# Patient Record
Sex: FEMALE | Race: WHITE | NOT HISPANIC OR LATINO | Employment: FULL TIME | URBAN - METROPOLITAN AREA
[De-identification: names, ages, dates, MRNs, and addresses within clinical notes are randomized per-mention and may not be internally consistent; named-entity substitution may affect disease eponyms.]

---

## 2018-04-16 ENCOUNTER — OFFICE VISIT CONVERTED (OUTPATIENT)
Dept: FAMILY MEDICINE CLINIC | Facility: CLINIC | Age: 28
End: 2018-04-16
Attending: NURSE PRACTITIONER

## 2018-05-03 ENCOUNTER — CONVERSION ENCOUNTER (OUTPATIENT)
Dept: FAMILY MEDICINE CLINIC | Facility: CLINIC | Age: 28
End: 2018-05-03

## 2018-05-03 ENCOUNTER — OFFICE VISIT CONVERTED (OUTPATIENT)
Dept: FAMILY MEDICINE CLINIC | Facility: CLINIC | Age: 28
End: 2018-05-03
Attending: NURSE PRACTITIONER

## 2018-06-04 ENCOUNTER — CONVERSION ENCOUNTER (OUTPATIENT)
Dept: FAMILY MEDICINE CLINIC | Facility: CLINIC | Age: 28
End: 2018-06-04

## 2018-06-04 ENCOUNTER — OFFICE VISIT CONVERTED (OUTPATIENT)
Dept: FAMILY MEDICINE CLINIC | Facility: CLINIC | Age: 28
End: 2018-06-04
Attending: NURSE PRACTITIONER

## 2018-08-16 ENCOUNTER — OFFICE VISIT CONVERTED (OUTPATIENT)
Dept: FAMILY MEDICINE CLINIC | Facility: CLINIC | Age: 28
End: 2018-08-16
Attending: NURSE PRACTITIONER

## 2018-08-16 ENCOUNTER — CONVERSION ENCOUNTER (OUTPATIENT)
Dept: FAMILY MEDICINE CLINIC | Facility: CLINIC | Age: 28
End: 2018-08-16

## 2018-11-01 ENCOUNTER — OFFICE VISIT CONVERTED (OUTPATIENT)
Dept: FAMILY MEDICINE CLINIC | Facility: CLINIC | Age: 28
End: 2018-11-01
Attending: NURSE PRACTITIONER

## 2019-08-22 ENCOUNTER — HOSPITAL ENCOUNTER (OUTPATIENT)
Dept: FAMILY MEDICINE CLINIC | Facility: CLINIC | Age: 29
Discharge: HOME | End: 2019-08-22
Attending: NURSE PRACTITIONER

## 2019-08-22 ENCOUNTER — OFFICE VISIT CONVERTED (OUTPATIENT)
Dept: FAMILY MEDICINE CLINIC | Facility: CLINIC | Age: 29
End: 2019-08-22
Attending: NURSE PRACTITIONER

## 2019-08-22 LAB
ALBUMIN SERPL-MCNC: 3.8 G/DL (ref 3.5–5)
ALBUMIN/GLOB SERPL: 1.3 {RATIO} (ref 1.4–2.6)
ALP SERPL-CCNC: 90 U/L (ref 42–98)
ALT SERPL-CCNC: 22 U/L (ref 10–40)
ANION GAP SERPL CALC-SCNC: 14 MMOL/L (ref 8–19)
AST SERPL-CCNC: 21 U/L (ref 15–50)
BASOPHILS # BLD AUTO: 0.04 10*3/UL (ref 0–0.2)
BASOPHILS NFR BLD AUTO: 0.5 % (ref 0–3)
BILIRUB SERPL-MCNC: 0.36 MG/DL (ref 0.2–1.3)
BUN SERPL-MCNC: 7 MG/DL (ref 5–25)
BUN/CREAT SERPL: 9 {RATIO} (ref 6–20)
CALCIUM SERPL-MCNC: 9.2 MG/DL (ref 8.7–10.4)
CHLORIDE SERPL-SCNC: 104 MMOL/L (ref 99–111)
CHOLEST SERPL-MCNC: 137 MG/DL (ref 107–200)
CHOLEST/HDLC SERPL: 2.9 {RATIO} (ref 3–6)
CONV ABS IMM GRAN: 0.02 10*3/UL (ref 0–0.2)
CONV CO2: 24 MMOL/L (ref 22–32)
CONV IMMATURE GRAN: 0.3 % (ref 0–1.8)
CONV TOTAL PROTEIN: 6.8 G/DL (ref 6.3–8.2)
CREAT UR-MCNC: 0.81 MG/DL (ref 0.5–0.9)
DEPRECATED RDW RBC AUTO: 43.3 FL (ref 36.4–46.3)
EOSINOPHIL # BLD AUTO: 0.21 10*3/UL (ref 0–0.7)
EOSINOPHIL # BLD AUTO: 2.6 % (ref 0–7)
ERYTHROCYTE [DISTWIDTH] IN BLOOD BY AUTOMATED COUNT: 13.2 % (ref 11.7–14.4)
GFR SERPLBLD BASED ON 1.73 SQ M-ARVRAT: >60 ML/MIN/{1.73_M2}
GLOBULIN UR ELPH-MCNC: 3 G/DL (ref 2–3.5)
GLUCOSE SERPL-MCNC: 93 MG/DL (ref 65–99)
HCT VFR BLD AUTO: 41.2 % (ref 37–47)
HDLC SERPL-MCNC: 47 MG/DL (ref 40–60)
HGB BLD-MCNC: 13 G/DL (ref 12–16)
LDLC SERPL CALC-MCNC: 66 MG/DL (ref 70–100)
LYMPHOCYTES # BLD AUTO: 2.17 10*3/UL (ref 1–5)
LYMPHOCYTES NFR BLD AUTO: 27.1 % (ref 20–45)
MCH RBC QN AUTO: 27.9 PG (ref 27–31)
MCHC RBC AUTO-ENTMCNC: 31.6 G/DL (ref 33–37)
MCV RBC AUTO: 88.4 FL (ref 81–99)
MONOCYTES # BLD AUTO: 0.55 10*3/UL (ref 0.2–1.2)
MONOCYTES NFR BLD AUTO: 6.9 % (ref 3–10)
NEUTROPHILS # BLD AUTO: 5.01 10*3/UL (ref 2–8)
NEUTROPHILS NFR BLD AUTO: 62.6 % (ref 30–85)
NRBC CBCN: 0 % (ref 0–0.7)
OSMOLALITY SERPL CALC.SUM OF ELEC: 284 MOSM/KG (ref 273–304)
PLATELET # BLD AUTO: 320 10*3/UL (ref 130–400)
PMV BLD AUTO: 11.5 FL (ref 9.4–12.3)
POTASSIUM SERPL-SCNC: 4.4 MMOL/L (ref 3.5–5.3)
RBC # BLD AUTO: 4.66 10*6/UL (ref 4.2–5.4)
SODIUM SERPL-SCNC: 138 MMOL/L (ref 135–147)
TRIGL SERPL-MCNC: 121 MG/DL (ref 40–150)
VLDLC SERPL-MCNC: 24 MG/DL (ref 5–37)
WBC # BLD AUTO: 8 10*3/UL (ref 4.8–10.8)

## 2019-09-11 ENCOUNTER — HOSPITAL ENCOUNTER (OUTPATIENT)
Dept: URGENT CARE | Facility: CLINIC | Age: 29
Discharge: HOME | End: 2019-09-11

## 2019-09-13 ENCOUNTER — HOSPITAL ENCOUNTER (OUTPATIENT)
Dept: URGENT CARE | Facility: CLINIC | Age: 29
Discharge: HOME | End: 2019-09-13
Attending: EMERGENCY MEDICINE

## 2020-03-18 ENCOUNTER — HOSPITAL ENCOUNTER (OUTPATIENT)
Dept: FAMILY MEDICINE CLINIC | Facility: CLINIC | Age: 30
Discharge: HOME | End: 2020-03-18
Attending: NURSE PRACTITIONER

## 2020-03-18 ENCOUNTER — OFFICE VISIT CONVERTED (OUTPATIENT)
Dept: FAMILY MEDICINE CLINIC | Facility: CLINIC | Age: 30
End: 2020-03-18
Attending: NURSE PRACTITIONER

## 2020-03-20 LAB — BACTERIA SPEC AEROBE CULT: NORMAL

## 2020-06-22 ENCOUNTER — HOSPITAL ENCOUNTER (OUTPATIENT)
Dept: FAMILY MEDICINE CLINIC | Facility: CLINIC | Age: 30
Discharge: HOME | End: 2020-06-22
Attending: NURSE PRACTITIONER

## 2020-06-22 ENCOUNTER — OFFICE VISIT CONVERTED (OUTPATIENT)
Dept: FAMILY MEDICINE CLINIC | Facility: CLINIC | Age: 30
End: 2020-06-22
Attending: NURSE PRACTITIONER

## 2020-06-22 LAB
25(OH)D3 SERPL-MCNC: 27.1 NG/ML (ref 30–100)
ALBUMIN SERPL-MCNC: 3.8 G/DL (ref 3.5–5)
ALBUMIN/GLOB SERPL: 1.3 {RATIO} (ref 1.4–2.6)
ALP SERPL-CCNC: 103 U/L (ref 42–98)
ALT SERPL-CCNC: 22 U/L (ref 10–40)
ANION GAP SERPL CALC-SCNC: 14 MMOL/L (ref 8–19)
AST SERPL-CCNC: 19 U/L (ref 15–50)
BASOPHILS # BLD AUTO: 0.08 10*3/UL (ref 0–0.2)
BASOPHILS NFR BLD AUTO: 0.8 % (ref 0–3)
BILIRUB SERPL-MCNC: 0.19 MG/DL (ref 0.2–1.3)
BUN SERPL-MCNC: 9 MG/DL (ref 5–25)
BUN/CREAT SERPL: 12 {RATIO} (ref 6–20)
CALCIUM SERPL-MCNC: 9.1 MG/DL (ref 8.7–10.4)
CHLORIDE SERPL-SCNC: 103 MMOL/L (ref 99–111)
CHOLEST SERPL-MCNC: 139 MG/DL (ref 107–200)
CHOLEST/HDLC SERPL: 2.7 {RATIO} (ref 3–6)
CONV ABS IMM GRAN: 0.03 10*3/UL (ref 0–0.2)
CONV CO2: 24 MMOL/L (ref 22–32)
CONV IMMATURE GRAN: 0.3 % (ref 0–1.8)
CONV TOTAL PROTEIN: 6.8 G/DL (ref 6.3–8.2)
CREAT UR-MCNC: 0.78 MG/DL (ref 0.5–0.9)
DEPRECATED RDW RBC AUTO: 44.1 FL (ref 36.4–46.3)
EOSINOPHIL # BLD AUTO: 0.29 10*3/UL (ref 0–0.7)
EOSINOPHIL # BLD AUTO: 2.8 % (ref 0–7)
ERYTHROCYTE [DISTWIDTH] IN BLOOD BY AUTOMATED COUNT: 13.9 % (ref 11.7–14.4)
GFR SERPLBLD BASED ON 1.73 SQ M-ARVRAT: >60 ML/MIN/{1.73_M2}
GLOBULIN UR ELPH-MCNC: 3 G/DL (ref 2–3.5)
GLUCOSE SERPL-MCNC: 94 MG/DL (ref 65–99)
HCT VFR BLD AUTO: 40.5 % (ref 37–47)
HDLC SERPL-MCNC: 51 MG/DL (ref 40–60)
HGB BLD-MCNC: 12.5 G/DL (ref 12–16)
LDLC SERPL CALC-MCNC: 67 MG/DL (ref 70–100)
LYMPHOCYTES # BLD AUTO: 2.81 10*3/UL (ref 1–5)
LYMPHOCYTES NFR BLD AUTO: 27.2 % (ref 20–45)
MCH RBC QN AUTO: 27.2 PG (ref 27–31)
MCHC RBC AUTO-ENTMCNC: 30.9 G/DL (ref 33–37)
MCV RBC AUTO: 88 FL (ref 81–99)
MONOCYTES # BLD AUTO: 0.64 10*3/UL (ref 0.2–1.2)
MONOCYTES NFR BLD AUTO: 6.2 % (ref 3–10)
NEUTROPHILS # BLD AUTO: 6.48 10*3/UL (ref 2–8)
NEUTROPHILS NFR BLD AUTO: 62.7 % (ref 30–85)
NRBC CBCN: 0 % (ref 0–0.7)
OSMOLALITY SERPL CALC.SUM OF ELEC: 282 MOSM/KG (ref 273–304)
PLATELET # BLD AUTO: 344 10*3/UL (ref 130–400)
PMV BLD AUTO: 11.7 FL (ref 9.4–12.3)
POTASSIUM SERPL-SCNC: 4.2 MMOL/L (ref 3.5–5.3)
RBC # BLD AUTO: 4.6 10*6/UL (ref 4.2–5.4)
SODIUM SERPL-SCNC: 137 MMOL/L (ref 135–147)
TRIGL SERPL-MCNC: 105 MG/DL (ref 40–150)
TSH SERPL-ACNC: 1.26 M[IU]/L (ref 0.27–4.2)
VLDLC SERPL-MCNC: 21 MG/DL (ref 5–37)
WBC # BLD AUTO: 10.33 10*3/UL (ref 4.8–10.8)

## 2020-09-28 ENCOUNTER — HOSPITAL ENCOUNTER (OUTPATIENT)
Dept: URGENT CARE | Facility: CLINIC | Age: 30
Discharge: HOME | End: 2020-09-28
Attending: NURSE PRACTITIONER

## 2020-09-30 LAB
BACTERIA SPEC AEROBE CULT: NORMAL
SARS-COV-2 RNA SPEC QL NAA+PROBE: NOT DETECTED

## 2020-10-07 ENCOUNTER — OFFICE VISIT CONVERTED (OUTPATIENT)
Dept: FAMILY MEDICINE CLINIC | Facility: CLINIC | Age: 30
End: 2020-10-07
Attending: NURSE PRACTITIONER

## 2021-01-27 ENCOUNTER — OFFICE VISIT CONVERTED (OUTPATIENT)
Dept: FAMILY MEDICINE CLINIC | Facility: CLINIC | Age: 31
End: 2021-01-27
Attending: NURSE PRACTITIONER

## 2021-01-27 ENCOUNTER — HOSPITAL ENCOUNTER (OUTPATIENT)
Dept: FAMILY MEDICINE CLINIC | Facility: CLINIC | Age: 31
Discharge: HOME | End: 2021-01-27
Attending: NURSE PRACTITIONER

## 2021-01-27 LAB
ALBUMIN SERPL-MCNC: 4 G/DL (ref 3.5–5)
ALBUMIN/GLOB SERPL: 1.3 {RATIO} (ref 1.4–2.6)
ALP SERPL-CCNC: 112 U/L (ref 42–98)
ALT SERPL-CCNC: 49 U/L (ref 10–40)
ANION GAP SERPL CALC-SCNC: 15 MMOL/L (ref 8–19)
AST SERPL-CCNC: 54 U/L (ref 15–50)
BILIRUB SERPL-MCNC: 0.45 MG/DL (ref 0.2–1.3)
BUN SERPL-MCNC: 5 MG/DL (ref 5–25)
BUN/CREAT SERPL: 6 {RATIO} (ref 6–20)
CALCIUM SERPL-MCNC: 8.9 MG/DL (ref 8.7–10.4)
CHLORIDE SERPL-SCNC: 103 MMOL/L (ref 99–111)
CONV CO2: 22 MMOL/L (ref 22–32)
CONV TOTAL PROTEIN: 7.1 G/DL (ref 6.3–8.2)
CREAT UR-MCNC: 0.79 MG/DL (ref 0.5–0.9)
GFR SERPLBLD BASED ON 1.73 SQ M-ARVRAT: >60 ML/MIN/{1.73_M2}
GLOBULIN UR ELPH-MCNC: 3.1 G/DL (ref 2–3.5)
GLUCOSE SERPL-MCNC: 99 MG/DL (ref 65–99)
OSMOLALITY SERPL CALC.SUM OF ELEC: 279 MOSM/KG (ref 273–304)
POTASSIUM SERPL-SCNC: 3.9 MMOL/L (ref 3.5–5.3)
SODIUM SERPL-SCNC: 136 MMOL/L (ref 135–147)
T4 FREE SERPL-MCNC: 0.9 NG/DL (ref 0.9–1.8)
TSH SERPL-ACNC: 0.82 M[IU]/L (ref 0.27–4.2)

## 2021-01-28 LAB — 25(OH)D3 SERPL-MCNC: 22.1 NG/ML (ref 30–100)

## 2021-02-26 ENCOUNTER — HOSPITAL ENCOUNTER (OUTPATIENT)
Dept: ULTRASOUND IMAGING | Facility: HOSPITAL | Age: 31
Discharge: HOME | End: 2021-02-26
Attending: NURSE PRACTITIONER

## 2021-04-28 ENCOUNTER — HOSPITAL ENCOUNTER (OUTPATIENT)
Dept: FAMILY MEDICINE CLINIC | Facility: CLINIC | Age: 31
Discharge: HOME | End: 2021-04-28
Attending: NURSE PRACTITIONER

## 2021-04-29 LAB — 25(OH)D3 SERPL-MCNC: 30.3 NG/ML (ref 30–100)

## 2021-05-13 NOTE — PROGRESS NOTES
Progress Note      Patient Name: Mendoza Jaramillo   Patient ID: [id number]   Sex: Female   YOB: 1990    Primary Care Provider: Latisha TIRADO    Visit Date: October 7, 2020    Provider: CANDIDA Holbrook   Location: [facility]   Location Address: [address] [place] [address]  [id number]   Location Phone: [phone]          Chief Complaint  · Check up to make sure she is safe to become pregnant      History Of Present Illness  Mendoza Jaramillo is a 29 year old /White female who presents for evaluation and treatment of:      Check up to make sure she is safe to become pregnant.  Last routine lab work done in 6/2020  Pt wants to make sure the meds she is taking is safe for her during pregnancy.    anxiety/depression - doing well with med.     HTN stable will change med when pt gets pregnant.    Last Pap- 11/2019 Dr. Cardona  Flu shot- requested today    BMI 32 - pt doesn't exercise often. Pt reports food is her stress relief. PT only drinks 2-3 bottles of water daily.       Past Medical History  Disease Name Date Onset Notes   Abdominal pain --  --    Accelerated hypertension --  --    Acute depression --  --    Anxiety --  --    Esophageal ulcer without bleeding --  --    Essential hypertension --  --    Gastric Ulcer --  --    GERD --  --    Hiatal hernia 04/16/2018 --    Interstitial cystitis 06/02/2017 --    Kidney stones --  --    Microhematuria --  --    Overactive bladder 06/02/2017 --    Stomach ulcer --  --          Past Surgical History  Procedure Name Date Notes   Colonoscopy 8/2017 --    EGD (Endoscopy) 5/2017 --    [place] Tooth Extraction --  --          Medication List  Name Date Started Instructions   apple cider vinegar oral  Take 1 Gummie PO QD   Lexapro 10 mg oral tablet 06/22/2020 take 1 tablet (10 mg) by oral route once daily for 90 days for 14 days   lisinopril 5 mg oral tablet 06/22/2020 take 1 tablet (5 mg) by oral  route once daily for 90 days for 90 days   omeprazole 20 mg oral capsule,delayed release(DR/EC) 06/22/2020 take 1 capsule (20 mg) by oral route once daily before a meal   Trivora (28) 50-30 (6)/75-40 (5)/125-30(10) oral tablet  take 1 tablet by oral route once daily for 28 days   Vitamin C 500 mg oral tablet  take 1 tablet by oral route daily   Vitamin D3 50 mcg (2,000 unit) oral tablet 06/22/2020 take 1 tablet by oral route daily         Allergy List  Allergen Name Date Reaction Notes   amoxicillin --  hives, yeast infection --    SULFA (SULFONAMIDES) --  face, eyes swelling --          Family Medical History  Disease Name Relative/Age Notes   Family history of colon cancer Uncle/   --    Family history of diabetes mellitus Grandmother (maternal)/   --    Family history of hypertension Brother/  Father/  Grandmother (paternal)/   --    Family history of kidney stones Mother/   --          Social History  Finding Status Start/Stop Quantity Notes   Alcohol Current some day --/-- --  --    Tobacco Never --/-- --  --          Immunizations  NameDate Admin Mfg Trade Name Lot Number Route Inj VIS Given VIS Publication   Aavjunjro19/07/2020 PMC Fluzone Quadrivalent [id number] IM LD 10/07/2020 08/15/2019   Comments: Injection given. Pt tolerated well. NDC 53827-858-81         Review of Systems  · Constitutional  o Admits  o : weight gain  o Denies  o : fever, fatigue, weight loss  · Cardiovascular  o Denies  o : lower extremity edema, claudication, chest pressure, palpitations  · Respiratory  o Denies  o : shortness of breath, wheezing, frequent cough, hemoptysis, dyspnea on exertion  · Gastrointestinal  o Denies  o : nausea, vomiting, diarrhea, constipation, abdominal pain  · Psychiatric  o Admits  o : anxiety, depression  o Denies  o : difficulty sleeping, mood changes, memory changes, SI/HI      Vitals  Date Time BP Position Site L\R Cuff Size HR RR TEMP (F) WT  HT  BMI kg/m2 BSA m2 O2 Sat FR L/min FiO2 HC      "  03/18/2020 11:57 /89 Sitting    114 - R 18 99 169lbs 0oz 5'  2\" 30.91 1.83 97 %      06/22/2020 07:33 /68 Sitting    72 - R 18 98.2 171lbs 2oz 5'  2\" 31.3 1.84 98 %      10/07/2020 07:54 /80 Sitting    85 - R 12 97.7 175lbs 16oz 5'  2\" 32.19 1.87 99 %            Physical Examination  · Constitutional  o Appearance  o : well-nourished, in no acute distress  · Eyes  o Conjunctivae  o : conjunctivae normal  o Sclerae  o : sclerae white  o Pupils and Irises  o : pupils equal and round  o Eyelids/Ocular Adnexae  o : eyelid appearance normal, no exudates present  · Neck  o Inspection/Palpation  o : normal appearance, no masses or tenderness, trachea midline  o Range of Motion  o : cervical range of motion within normal limits  o Thyroid  o : gland size normal, nontender, no nodules or masses present on palpation  · Respiratory  o Respiratory Effort  o : breathing unlabored  o Inspection of Chest  o : normal appearance  o Auscultation of Lungs  o : normal breath sounds throughout inspiration and expiration  · Cardiovascular  o Heart  o :   § Auscultation of Heart  § : regular rate and rhythm, no murmurs, gallops or rubs  o Peripheral Vascular System  o :   § Carotid Arteries  § : normal pulses bilaterally, no bruits present  § Extremities  § : no clubbing or edema  · Gastrointestinal  o Abdominal Examination  o : abdomen nontender to palpation, tone normal without rigidity or guarding, no masses present, bowel sounds present  · Skin and Subcutaneous Tissue  o General Inspection  o : no rashes or lesions present, no areas of discoloration  o Body Hair  o : hair normal for age, general body hair distribution normal for age  o Digits and Nails  o : no clubbing, cyanosis, deformities or edema present, normal appearing nails  · Neurologic  o Mental Status Examination  o :   § Orientation  § : grossly oriented to person, place and time  o Gait and Station  o : normal gait, able to stand without " difficulty  · Psychiatric  o Judgement and Insight  o : judgment and insight intact  o Mood and Affect  o : mood normal, affect appropriate          Assessment  · Screening for depression     V79.0/Z13.89  · Need for influenza vaccination     V04.81/Z23  · Anxiety disorder     300.00/F41.9  · Depression     311/F32.9  · Essential hypertension     401.9/I10  Call when pregnant and we will change medication.   · GERD (gastroesophageal reflux disease)     530.81/K21.9  · Vitamin D deficiency     268.9/E55.9      Plan  · Orders  o Immunization Admin Fee (Single) ([facility]) (29977) - V04.81/Z23 - 10/07/2020  o Fluzone Quadrivalent Vaccine, age 6 months + (60870) - V04.81/Z23 - 10/07/2020   Vaccine - Influenza; Dose: 0.5; Site: Left Deltoid; Route: Intramuscular; Date: 10/07/2020 08:36:00; Exp: 06/01/3021; Lot: [id number]; Mfg: sanofi pasteur; TradeName: Fluzone Quadrivalent; Administered By: Merle Al; Comment: Injection given. Pt tolerated well. NDC 30221-451-21  o ACO-39: Current medications updated and reviewed (, 1159F) - - 10/07/2020  o ACO-18: Positive screen for clinical depression using a standardized tool and a follow-up plan documented () - - 10/07/2020  o ACO-14: Influenza immunization administered or previously received [facility] () - - 10/07/2020  · Medications  o Medications have been Reconciled  o Transition of Care or Provider Policy  · Instructions  o Depression Screen completed and scanned into the EMR under the designated folder within the patient's documents.  o Today's PHQ-9 result is __8_  o Patient was given an SSRI/SSNRI medication and warned of possible side effects of the medication including potential for increased risk of suicidal thoughts and feelings. Patient was instructed that if they begin to exhibit any of these effects they will discontinue the medication immediately and contact our office or the ER ASAP.  o Handouts were given to patient: carb counting  o Patient was  educated/instructed on their diagnosis, treatment and medications prior to discharge from the clinic today.  o Call the office with any concerns or questions.  o Electronically Identified Patient Education Materials Provided Electronically  · Disposition  o FOLLOW UP PRN            Electronically Signed by: CANDIDA Holbrook -Author on October 7, 2020 09:09:51 AM

## 2021-05-13 NOTE — PROGRESS NOTES
Progress Note      Patient Name: Mendoza Jaramillo   Patient ID: [id number]   Sex: Female   YOB: 1990    Primary Care Provider: Latisha TIRADO    Visit Date: June 22, 2020    Provider: CANDIDA Holbrook   Location: [facility]   Location Address: [address] [place] [address]  [id number]   Location Phone: [phone]          Chief Complaint  · Follow up for HTN, Anxiety, Depression, and GERD      History Of Present Illness  Mendoza Jaramillo is a 29 year old /White female who presents for evaluation and treatment of:      Follow up for HTN, Anxiety, Depression, and GERD  Last lab work done 8/2019, Pt would like to have Vitamin D level checked due to FHx of low vitamin D.  Med refills needed    Pt c/o having hot flashes and feels like she is sweating more.     Pt reported her birth control was changed by Dr. Cardona due to low sex drive    Pt said she has cut out soda's and trying to eat better to loose weight.    HTN - well controlled.    GERD - controlled with med.     Anxiety - controlled with Lexapro. pt previously failed Zoloft. pt doesn't want to change med at present.    flu shot-10/2019  Colon-8/2017  Pap- 8/2019 at [facility]  Non-smoker       Past Medical History  Disease Name Date Onset Notes   Abdominal pain --  --    Accelerated hypertension --  --    Acute depression --  --    Anxiety --  --    Esophageal ulcer without bleeding --  --    Essential hypertension --  --    Gastric Ulcer --  --    GERD --  --    Hiatal hernia 04/16/2018 --    Interstitial cystitis 06/02/2017 --    Kidney stones --  --    Microhematuria --  --    Overactive bladder 06/02/2017 --    Stomach ulcer --  --          Past Surgical History  Procedure Name Date Notes   Colonoscopy 8/2017 --    EGD (Endoscopy) 5/2017 --    [place] Tooth Extraction --  --          Medication List  Name Date Started Instructions   apple cider vinegar oral  Take 1 Gummie PO QD   Lexapro 10 mg oral  tablet 06/22/2020 take 1 tablet (10 mg) by oral route once daily for 90 days for 14 days   lisinopril 5 mg oral tablet 06/22/2020 take 1 tablet (5 mg) by oral route once daily for 90 days for 90 days   omeprazole 20 mg oral capsule,delayed release(DR/EC) 06/22/2020 take 1 capsule (20 mg) by oral route once daily before a meal   Trivora (28) 50-30 (6)/75-40 (5)/125-30(10) oral tablet  take 1 tablet by oral route once daily for 28 days   Vitamin C 500 mg oral tablet  take 1 tablet by oral route daily         Allergy List  Allergen Name Date Reaction Notes   amoxicillin --  hives, yeast infection --    SULFA (SULFONAMIDES) --  face, eyes swelling --          Family Medical History  Disease Name Relative/Age Notes   Family history of colon cancer Uncle/   --    Family history of diabetes mellitus Grandmother (maternal)/   --    Family history of hypertension Brother/  Father/  Grandmother (paternal)/   --    Family history of kidney stones Mother/   --          Social History  Finding Status Start/Stop Quantity Notes   Alcohol Current some day --/-- --  --    Tobacco Never --/-- --  --          Immunizations  NameDate Admin Mfg Trade Name Lot Number Route Inj VIS Given VIS Publication   Skypkkjhn64/01/2019 NE Not Entered  NE NE     Comments:          Review of Systems  · Constitutional  o Admits  o : fatigue, weight gain  o Denies  o : fever, weight loss, chills  · Cardiovascular  o Denies  o : chest Pain, palpitations, edema (swelling)  · Respiratory  o Denies  o : frequent cough, shortness of breath  · Gastrointestinal  o Denies  o : nausea, vomiting, changes in bowel habits  · Genitourinary  o Denies  o : dysuria, urinary frequency, urinary urgency, polyuria  · Neurologic  o Denies  o : headache, tingling or numbness, dizziness  · Musculoskeletal  o Denies  o : joint pain, myalgias  · Endocrine  o Admits  o : heat intolerance  o Denies  o : polydipsia, polyphagia  · Psychiatric  o Admits  o : anxiety,  "depression  o Denies  o : mood changes, memory changes, SI/HI      Vitals  Date Time BP Position Site L\R Cuff Size HR RR TEMP (F) WT  HT  BMI kg/m2 BSA m2 O2 Sat HC       08/22/2019 08:26 /65 Sitting    78 - R 21 98.1 162lbs 0oz 5'  2\" 29.63 1.79 100 %    03/18/2020 11:57 /89 Sitting    114 - R 18 99 169lbs 0oz 5'  2\" 30.91 1.83 97 %    06/22/2020 07:33 /68 Sitting    72 - R 18 98.2 171lbs 2oz 5'  2\" 31.3 1.84 98 %          Physical Examination  · Constitutional  o Appearance  o : well-nourished, in no acute distress  · Eyes  o Conjunctivae  o : conjunctivae normal  o Sclerae  o : sclerae white  o Pupils and Irises  o : pupils equal and round  o Eyelids/Ocular Adnexae  o : eyelid appearance normal, no exudates present  · Respiratory  o Respiratory Effort  o : breathing unlabored  o Inspection of Chest  o : normal appearance  o Auscultation of Lungs  o : normal breath sounds throughout inspiration and expiration  · Cardiovascular  o Heart  o :   § Auscultation of Heart  § : regular rate and rhythm, no murmurs, gallops or rubs  o Peripheral Vascular System  o :   § Carotid Arteries  § : normal pulses bilaterally, no bruits present  § Extremities  § : no clubbing or edema  · Gastrointestinal  o Abdominal Examination  o : abdomen nontender to palpation, tone normal without rigidity or guarding, no masses present  · Skin and Subcutaneous Tissue  o General Inspection  o : no rashes or lesions present, no areas of discoloration  o Body Hair  o : hair normal for age, general body hair distribution normal for age  o Digits and Nails  o : no clubbing, cyanosis, deformities or edema present, normal appearing nails  · Neurologic  o Mental Status Examination  o :   § Orientation  § : grossly oriented to person, place and time  o Gait and Station  o : normal gait, able to stand without difficulty  · Psychiatric  o Judgement and Insight  o : judgment and insight intact  o Mood and Affect  o : mood normal, affect " appropriate          Assessment  · Anxiety disorder     300.00/F41.9  · Depression     311/F32.9  · Essential hypertension     401.9/I10  · GERD (gastroesophageal reflux disease)     530.81/K21.9  · Vitamin D deficiency     268.9/E55.9  · Medication side effect     995.20/T88.7XXA      Plan  · Orders  o Vitamin D Level (86153) - 268.9/E55.9 - 06/22/2020  o Physical, Primary Care Panel (CBC, CMP, Lipid, TSH) [facility] (, [address], [address], [address]) - 401.9/I10 - 06/22/2020  o ACO-39: Current medications updated and reviewed () - - 06/22/2020  · Medications  o Lexapro 10 mg oral tablet   SIG: take 1 tablet (10 mg) by oral route once daily for 90 days for 14 days   DISP: (90) Tablet with 3 refills  Adjusted on 06/22/2020     o lisinopril 5 mg oral tablet   SIG: take 1 tablet (5 mg) by oral route once daily for 90 days for 90 days   DISP: (90) Tablet with 3 refills  Adjusted on 06/22/2020     o omeprazole 20 mg oral capsule,delayed release(DR/EC)   SIG: take 1 capsule (20 mg) by oral route once daily before a meal   DISP: (90) Capsule with 3 refills  Adjusted on 06/22/2020     o Medications have been Reconciled  o Transition of Care or Provider Policy  · Instructions  o Patient was given an SSRI/SSNRI medication and warned of possible side effects of the medication including potential for increased risk of suicidal thoughts and feelings. Patient was instructed that if they begin to exhibit any of these effects they will discontinue the medication immediately and contact our office or the ER ASAP.  o Patient advised to monitor blood pressure (B/P) at home and journal readings. Patient informed that a B/P reading at home of more than 130/80 is considered hypertension. For readings greater auuc453/90 or higher patient is advised to follow up in the office with readings for management. Patient advised to limit sodium intake.  o Maintain a healthy weight. Avoid tight fitting clothes. Avoid fried, fatty foods, tomato sauce,  chocolate, mint, garlic, onion, alcohol. caffeine. Eat smaller meals, dont lie down after a meal, dont smoke. Elevate the head of your bed 6-9 inches.  o Patient was educated/instructed on their diagnosis, treatment and medications prior to discharge from the clinic today.  o Call the office with any concerns or questions.  · Disposition  o Return to Office in 1 year.            Electronically Signed by: CANDIDA Holbrook -Author on June 22, 2020 08:02:58 AM

## 2021-05-14 VITALS
WEIGHT: 182.12 LBS | HEART RATE: 81 BPM | OXYGEN SATURATION: 98 % | BODY MASS INDEX: 33.51 KG/M2 | SYSTOLIC BLOOD PRESSURE: 114 MMHG | TEMPERATURE: 98.7 F | HEIGHT: 62 IN | DIASTOLIC BLOOD PRESSURE: 67 MMHG

## 2021-05-14 VITALS
DIASTOLIC BLOOD PRESSURE: 80 MMHG | WEIGHT: 176 LBS | RESPIRATION RATE: 12 BRPM | HEIGHT: 62 IN | TEMPERATURE: 97.7 F | SYSTOLIC BLOOD PRESSURE: 123 MMHG | HEART RATE: 85 BPM | BODY MASS INDEX: 32.39 KG/M2 | OXYGEN SATURATION: 99 %

## 2021-05-14 NOTE — PROGRESS NOTES
Progress Note      Patient Name: Mendoza Jaramillo   Patient ID: [id number]   Sex: Female   YOB: 1990    Primary Care Provider: Latisha TIRADO    Visit Date: January 27, 2021    Provider: CANDIDA Holbrook   Location: [facility]   Location Address: [address] [place] [address]  [id number]   Location Phone: [phone]          Chief Complaint  · Acute Visit for elevated liver enzymes      History Of Present Illness  Mendoza Jaramillo is a 30 year old /White female who presents for evaluation and treatment of:      Acute Visit for elevated liver enzymes.    ALT/AST - 80/59  Pt denies any recent illness, however admits to sinus drainage. Pt has not had loss of taste and smell.   Pt has had 6 lb weight gain.     Family history of fatty liver - brother.     Pt reports she has increased her water intake to 64 oz. Cutting back on soda. Pt admits to snacking and stress eating. Pt stopped ocp in 12.25.20 and has not had menses.       Pap- 11/2020           Past Medical History  Disease Name Date Onset Notes   Abdominal Pain --  --    Accelerated hypertension --  --    Acute depression --  --    Anxiety --  --    Esophageal ulcer without bleeding --  --    Essential hypertension --  --    Gastric Ulcer --  --    GERD --  --    Hiatal hernia 04/16/2018 --    Interstitial cystitis 06/02/2017 --    Kidney stones --  --    Microhematuria --  --    Overactive bladder 06/02/2017 --    Stomach ulcer --  --          Past Surgical History  Procedure Name Date Notes   Colonoscopy 8/2017 --    EGD (Endoscopy) 5/2017 --    [place] Tooth Extraction --  --          Medication List  Name Date Started Instructions   apple cider vinegar oral  Take 1 Gummie PO QD   Lexapro 10 mg oral tablet 06/22/2020 take 1 tablet (10 mg) by oral route once daily for 90 days for 14 days   lisinopril 5 mg oral tablet 06/22/2020 take 1 tablet (5 mg) by oral route once daily for 90 days  for 90 days   omeprazole 20 mg oral capsule,delayed release(DR/EC) 06/22/2020 take 1 capsule (20 mg) by oral route once daily before a meal   Vitamin C 500 mg oral tablet  take 1 tablet by oral route daily   Vitamin D3 50 mcg (2,000 unit) oral tablet 06/22/2020 take 1 tablet by oral route daily         Allergy List  Allergen Name Date Reaction Notes   amoxicillin --  hives, yeast infection --    SULFA (SULFONAMIDES) --  face, eyes swelling --        Allergies Reconciled  Family Medical History  Disease Name Relative/Age Notes   Family history of colon cancer Uncle/   --    Family history of diabetes mellitus Grandmother (maternal)/   --    Family history of hypertension Brother/  Father/  Grandmother (paternal)/   --    Family history of kidney stones Mother/   --          Social History  Finding Status Start/Stop Quantity Notes   Alcohol Current some day --/-- --  --    Tobacco Never --/-- --  --          Immunizations  NameDate Admin Mfg Trade Name Lot Number Route Inj VIS Given VIS Publication   [id number]/07/2020 PMC Fluzone Quadrivalent [id number] LD 10/07/2020 08/15/2019   Comments: Injection given. Pt tolerated well. NDC 17158-178-62         Review of Systems  · Constitutional  o Admits  o : weight gain  o Denies  o : fatigue  · HENT  o Denies  o : headaches  · Cardiovascular  o Denies  o : chest pain, irregular heart beats, rapid heart rate, dyspnea on exertion  · Respiratory  o Denies  o : shortness of breath, wheezing, cough  · Gastrointestinal  o Denies  o : nausea, vomiting, diarrhea, constipation, abdominal pain, blood in stools, melena  · Genitourinary  o Denies  o : frequency, dysuria, hematuria  · Integument  o Denies  o : rash, new skin lesions  · Musculoskeletal  o Denies  o : joint pain, joint swelling, muscle pain  · Endocrine  o Admits  o : central obesity  o Denies  o : polyuria, polydipsia  · Psychiatric  o Admits  o : anxiety, depression      Vitals  Date Time BP Position Site L\R Cuff Size  "HR RR TEMP (F) WT  HT  BMI kg/m2 BSA m2 O2 Sat FR L/min FiO2 HC       06/22/2020 07:33 /68 Sitting    72 - R 18 98.2 171lbs 2oz 5'  2\" 31.3 1.84 98 %      10/07/2020 07:54 /80 Sitting    85 - R 12 97.7 175lbs 16oz 5'  2\" 32.19 1.87 99 %      01/27/2021 11:48 /67 Sitting    81 - R  98.7 182lbs 2oz 5'  2\" 33.31 1.9 98 %  21%          Physical Examination  · Constitutional  o Appearance  o : well-nourished, in no acute distress  · Eyes  o Conjunctivae  o : conjunctivae normal  o Sclerae  o : sclerae white  o Pupils and Irises  o : pupils equal and round  o Eyelids/Ocular Adnexae  o : eyelid appearance normal, no exudates present  · Neck  o Inspection/Palpation  o : normal appearance, no masses or tenderness, trachea midline  o Range of Motion  o : cervical range of motion within normal limits  o Thyroid  o : gland size normal, nontender, no nodules or masses present on palpation  · Respiratory  o Respiratory Effort  o : breathing unlabored  o Inspection of Chest  o : normal appearance  o Auscultation of Lungs  o : normal breath sounds throughout inspiration and expiration  · Cardiovascular  o Heart  o :   § Auscultation of Heart  § : regular rate and rhythm, no murmurs, gallops or rubs  · Gastrointestinal  o Abdominal Examination  o : abdomen nontender to palpation, tone normal without rigidity or guarding, no masses present, bowel sounds present in all four quadrants  · Skin and Subcutaneous Tissue  o General Inspection  o : no rashes or lesions present, no areas of discoloration  o Body Hair  o : hair normal for age, general body hair distribution normal for age  o Digits and Nails  o : no clubbing, cyanosis, deformities or edema present, normal appearing nails  · Neurologic  o Mental Status Examination  o :   § Orientation  § : grossly oriented to person, place and time  o Gait and Station  o : normal gait, able to stand without difficulty  · Psychiatric  o Judgement and Insight  o : judgment and " insight intact  o Mood and Affect  o : mood normal, affect appropriate              Assessment  · Elevated liver function tests     790.6/R79.89  · Family history of fatty liver     V18.59/Z83.79  · Weight gain     783.1/R63.5  · Irregular menses     626.4/N92.6      Plan  · Orders  o Thyroid Profile (46065, 62771, THYII) - 783.1/R63.5, 626.4/N92.6 - 01/27/2021  o ACO-14: Influenza immunization administered or previously received [facility] () - - 01/27/2021  o ACO-39: Current medications updated and reviewed (1159F, ) - - 01/27/2021  o Ultrasound of liver and gallbladder (26186) - 790.6/R79.89, V18.59/Z83.79 - 01/27/2021  o CMP Non-fasting HM (54386) - 790.6/R79.89 - 01/27/2021  · Medications  o Medications have been Reconciled  o Transition of Care or Provider Policy  · Instructions  o Patient instructed/educated on their diet and exercise program.  o Patient was educated/instructed on their diagnosis, treatment and medications prior to discharge from the clinic today.  o Call the office with any concerns or questions.            Electronically Signed by: CANDIDA Holbrook -Author on January 27, 2021 12:20:21 PM

## 2021-05-15 VITALS
DIASTOLIC BLOOD PRESSURE: 89 MMHG | SYSTOLIC BLOOD PRESSURE: 126 MMHG | WEIGHT: 169 LBS | HEART RATE: 114 BPM | BODY MASS INDEX: 31.1 KG/M2 | OXYGEN SATURATION: 97 % | HEIGHT: 62 IN | TEMPERATURE: 99 F | RESPIRATION RATE: 18 BRPM

## 2021-05-15 VITALS
HEART RATE: 78 BPM | BODY MASS INDEX: 29.81 KG/M2 | HEIGHT: 62 IN | OXYGEN SATURATION: 100 % | WEIGHT: 162 LBS | RESPIRATION RATE: 21 BRPM | SYSTOLIC BLOOD PRESSURE: 111 MMHG | TEMPERATURE: 98.1 F | DIASTOLIC BLOOD PRESSURE: 65 MMHG

## 2021-05-15 VITALS
TEMPERATURE: 98.2 F | DIASTOLIC BLOOD PRESSURE: 68 MMHG | WEIGHT: 171.12 LBS | HEART RATE: 72 BPM | OXYGEN SATURATION: 98 % | HEIGHT: 62 IN | SYSTOLIC BLOOD PRESSURE: 110 MMHG | BODY MASS INDEX: 31.49 KG/M2 | RESPIRATION RATE: 18 BRPM

## 2021-05-16 VITALS
DIASTOLIC BLOOD PRESSURE: 86 MMHG | OXYGEN SATURATION: 98 % | RESPIRATION RATE: 21 BRPM | BODY MASS INDEX: 25.76 KG/M2 | HEART RATE: 70 BPM | TEMPERATURE: 97.8 F | WEIGHT: 140 LBS | SYSTOLIC BLOOD PRESSURE: 116 MMHG | HEIGHT: 62 IN

## 2021-05-16 VITALS
HEART RATE: 88 BPM | SYSTOLIC BLOOD PRESSURE: 134 MMHG | DIASTOLIC BLOOD PRESSURE: 73 MMHG | HEIGHT: 62 IN | OXYGEN SATURATION: 97 % | BODY MASS INDEX: 26.31 KG/M2 | TEMPERATURE: 97.9 F | WEIGHT: 143 LBS | RESPIRATION RATE: 22 BRPM

## 2021-05-16 VITALS
HEIGHT: 62 IN | TEMPERATURE: 98.6 F | WEIGHT: 132 LBS | HEART RATE: 74 BPM | BODY MASS INDEX: 24.29 KG/M2 | RESPIRATION RATE: 21 BRPM | DIASTOLIC BLOOD PRESSURE: 73 MMHG | SYSTOLIC BLOOD PRESSURE: 126 MMHG | OXYGEN SATURATION: 98 %

## 2021-05-16 VITALS
TEMPERATURE: 97.6 F | DIASTOLIC BLOOD PRESSURE: 75 MMHG | BODY MASS INDEX: 25.58 KG/M2 | OXYGEN SATURATION: 99 % | WEIGHT: 139 LBS | HEART RATE: 86 BPM | HEIGHT: 62 IN | SYSTOLIC BLOOD PRESSURE: 125 MMHG | RESPIRATION RATE: 21 BRPM

## 2021-05-16 VITALS
HEART RATE: 66 BPM | BODY MASS INDEX: 25.4 KG/M2 | DIASTOLIC BLOOD PRESSURE: 66 MMHG | HEIGHT: 62 IN | SYSTOLIC BLOOD PRESSURE: 118 MMHG | RESPIRATION RATE: 12 BRPM | OXYGEN SATURATION: 100 % | WEIGHT: 138 LBS | TEMPERATURE: 99.8 F

## 2021-06-02 ENCOUNTER — OFFICE VISIT CONVERTED (OUTPATIENT)
Dept: FAMILY MEDICINE CLINIC | Facility: CLINIC | Age: 31
End: 2021-06-02
Attending: NURSE PRACTITIONER

## 2021-06-05 NOTE — PROGRESS NOTES
Progress Note      Patient Name: Mendoza Jaramillo   Patient ID: [id number]   Sex: Female   YOB: 1990    Primary Care Provider: Latisha TIRADO    Visit Date: June 2, 2021    Provider: CANDIDA Holborok   [facility]: [facility]   Location Address: [address] [place] [address]  [id number]   Location Phone: [phone]          Chief Complaint  · Possible Pregnancy      History Of Present Illness  Mendoza Jaramillo is a 30 year old /White female who presents for evaluation and treatment of:      Pt is here for possible pregnancy.  Took 4 home pregnancy tests.  LMP- 4/11/21    Stopped lisinopril and Prilosec  Checked  BP yesterday was 145/86 in the AM and Monday was 138/97.    Pap- 12/2020  OB - Dr. Cardona on July 2nd.     GERD - pt has switched to tums.       Past Medical History  Disease Name Date Onset Notes   Abdominal pain --  --    Accelerated hypertension --  --    Acute depression --  --    Anxiety --  --    Esophageal ulcer without bleeding --  --    Essential hypertension --  --    Family history of fatty liver 01/27/2021 --    Gastric Ulcer --  --    GERD --  --    Hiatal hernia 04/16/2018 --    Interstitial cystitis 06/02/2017 --    Kidney Stones --  --    Microhematuria --  --    Overactive bladder 06/02/2017 --    Stomach ulcer --  --          Past Surgical History  Procedure Name Date Notes   Colonoscopy 8/2017 --    EGD (Endoscopy) 5/2017 --    [place] Tooth Extraction --  --          Medication List  Name Date Started Instructions   Lexapro 10 mg oral tablet 06/02/2021 take 1 tablet (10 mg) by oral route once daily for 90 days   Vitamin D3 50 mcg (2,000 unit) oral tablet 06/22/2020 take 1 tablet by oral route daily         Allergy List  Allergen Name Date Reaction Notes   amoxicillin --  hives, yeast infection --    SULFA (SULFONAMIDES) --  face, eyes swelling --          Family Medical History  Disease Name Relative/Age Notes  "  Family history of colon cancer Uncle/   --    Family history of diabetes mellitus Grandmother (maternal)/   --    Family history of hypertension Brother/  Father/  Grandmother (paternal)/   --    Family history of kidney stones Mother/   --          Social History  Finding Status Start/Stop Quantity Notes   Alcohol Current some day --/-- --  --    Tobacco Never --/-- --  --          Immunizations  NameDate Admin Mfg Trade Name Lot Number Route Inj VIS Given VIS Publication   Eolyzipeu87/07/2020 [facility] Fluzone Quadrivalent [id number] IM LD 10/07/2020 08/15/2019   Comments: Injection given. Pt tolerated well. NDC 00475-762-65         Review of Systems  · Constitutional  o Denies  o : fatigue, fever, weight gain, weight loss, chills  · Cardiovascular  o Denies  o : chest Pain, palpitations, edema (swelling)  · Respiratory  o Denies  o : frequent cough, shortness of breath  · Gastrointestinal  o Denies  o : nausea, vomiting, changes in bowel habits  · Genitourinary  o Denies  o : dysuria, urinary frequency, urinary urgency, polyuria  · Neurologic  o Denies  o : headache, tingling or numbness, dizziness  · Musculoskeletal  o Denies  o : joint pain, myalgias  · Psychiatric  o Admits  o : anxiety  o Denies  o : mood changes, memory changes, SI/HI      Vitals  Date Time BP Position Site L\R Cuff Size HR RR TEMP (F) WT  HT  BMI kg/m2 BSA m2 O2 Sat FR L/min FiO2 HC       10/07/2020 07:54 /80 Sitting    85 - R 12 97.7 175lbs 16oz 5'  2\" 32.19 1.87 99 %      01/27/2021 11:48 /67 Sitting    81 - R  98.7 182lbs 2oz 5'  2\" 33.31 1.9 98 %  21%    06/02/2021 07:41 /77 Sitting    91 - R 18 98.1 185lbs 16oz 5'  2\" 34.02 1.92 97 %            Physical Examination  · Constitutional  o Appearance  o : well-nourished, in no acute distress  · Eyes  o Conjunctivae  o : conjunctivae normal  o Sclerae  o : sclerae white  o Pupils and Irises  o : pupils equal and round  o Eyelids/Ocular Adnexae  o : eyelid appearance normal, no " exudates present  · Neck  o Inspection/Palpation  o : normal appearance, no masses or tenderness, trachea midline  o Range of Motion  o : cervical range of motion within normal limits  o Thyroid  o : gland size normal, nontender, no nodules or masses present on palpation  · Respiratory  o Respiratory Effort  o : breathing unlabored  o Inspection of Chest  o : normal appearance  o Auscultation of Lungs  o : normal breath sounds throughout inspiration and expiration  · Cardiovascular  o Heart  o :   § Auscultation of Heart  § : regular rate and rhythm, no murmurs, gallops or rubs  · Gastrointestinal  o Abdominal Examination  o : abdomen nontender to palpation, tone normal without rigidity or guarding, no masses present, bowel sounds present  · Skin and Subcutaneous Tissue  o General Inspection  o : no rashes or lesions present, no areas of discoloration  o Body Hair  o : hair normal for age, general body hair distribution normal for age  o Digits and Nails  o : no clubbing, cyanosis, deformities or edema present, normal appearing nails  · Neurologic  o Mental Status Examination  o :   § Orientation  § : grossly oriented to person, place and time  o Gait and Station  o : normal gait, able to stand without difficulty  · Psychiatric  o Judgement and Insight  o : judgment and insight intact  o Mood and Affect  o : mood normal, affect appropriate          Assessment  · Anxiety disorder     300.00/F41.9  · Essential hypertension     401.9/I10  · Pregnancy     V22.2/Z34.90      Plan  · Orders  o ACO-39: Current medications updated and reviewed (1159F, ) - - 06/02/2021  o Beta HCG Quantitative Pregnancy (15907) - - 06/02/2021  · Medications  o labetalol 100 mg oral tablet   SIG: take 1 tablet (100 mg) by oral route 2 times per day for 30 days   DISP: (60) Tablet with 2 refills  Prescribed on 06/02/2021     o Lexapro 10 mg oral tablet   SIG: take 1 tablet (10 mg) by oral route once daily for 90 days   DISP: (90) Tablet  with 3 refills  Adjusted on 06/02/2021     o lisinopril 5 mg oral tablet   SIG: take 1 tablet (5 mg) by oral route once daily for 90 days for 90 days   DISP: (90) Tablet with 3 refills  Discontinued on 06/02/2021     o omeprazole 20 mg oral capsule,delayed release(DR/EC)   SIG: take 1 capsule (20 mg) by oral route once daily before a meal   DISP: (90) Capsule with 3 refills  Discontinued on 06/02/2021     o Medications have been Reconciled  o Transition of Care or Provider Policy  · Instructions  o Patient advised to monitor blood pressure (B/P) at home and journal readings. Patient informed that a B/P reading at home of more than 130/80 is considered hypertension. For readings greater vxsv878/90 or higher patient is advised to follow up in the office with readings for management. Patient advised to limit sodium intake.  o Patient was educated/instructed on their diagnosis, treatment and medications prior to discharge from the clinic today.  o Call the office with any concerns or questions.  o Continue prenatal. monitor b/p and call with readings above 140/90 or symptoms of low b/p.  · Disposition  o Keep follow up as scheduled.            Electronically Signed by: CANDIDA Holbrook -Author on June 2, 2021 08:26:48 AM

## 2021-06-07 ENCOUNTER — TELEPHONE (OUTPATIENT)
Dept: FAMILY MEDICINE CLINIC | Facility: CLINIC | Age: 31
End: 2021-06-07

## 2021-06-09 RX ORDER — OMEPRAZOLE 20 MG/1
CAPSULE, DELAYED RELEASE ORAL
Qty: 30 CAPSULE | Refills: 3 | Status: SHIPPED | OUTPATIENT
Start: 2021-06-09 | End: 2021-10-07

## 2021-06-09 RX ORDER — ESCITALOPRAM OXALATE 10 MG/1
TABLET ORAL
Qty: 90 TABLET | Refills: 1 | Status: SHIPPED | OUTPATIENT
Start: 2021-06-09 | End: 2021-06-23 | Stop reason: SDUPTHER

## 2021-06-17 PROBLEM — R31.29 MICROHEMATURIA: Status: ACTIVE | Noted: 2021-06-17

## 2021-06-17 PROBLEM — N30.10 INTERSTITIAL CYSTITIS: Status: ACTIVE | Noted: 2017-06-02

## 2021-06-17 PROBLEM — K21.9 ESOPHAGEAL REFLUX: Status: ACTIVE | Noted: 2021-06-17

## 2021-06-17 PROBLEM — I10 ACCELERATED HYPERTENSION: Status: ACTIVE | Noted: 2021-06-17

## 2021-06-17 PROBLEM — Z83.79 FAMILY HISTORY OF FATTY LIVER: Status: ACTIVE | Noted: 2021-01-27

## 2021-06-17 PROBLEM — K44.9 HIATAL HERNIA: Status: ACTIVE | Noted: 2018-04-16

## 2021-06-17 PROBLEM — F41.9 ANXIETY: Status: ACTIVE | Noted: 2021-06-17

## 2021-06-17 PROBLEM — N32.81 OVERACTIVE BLADDER: Status: ACTIVE | Noted: 2017-06-02

## 2021-06-17 PROBLEM — R10.9 ABDOMINAL PAIN: Status: ACTIVE | Noted: 2021-06-17

## 2021-06-17 PROBLEM — I10 ESSENTIAL HYPERTENSION: Status: ACTIVE | Noted: 2021-06-17

## 2021-06-17 PROBLEM — F32.A ACUTE DEPRESSION: Status: ACTIVE | Noted: 2021-06-17

## 2021-06-17 PROBLEM — N20.0 KIDNEY STONES: Status: ACTIVE | Noted: 2021-06-17

## 2021-06-17 RX ORDER — DICYCLOMINE HCL 20 MG
1 TABLET ORAL 3 TIMES DAILY
COMMUNITY
End: 2021-06-17

## 2021-06-17 RX ORDER — LEVONORGESTREL AND ETHINYL ESTRADIOL 6-5-10
1 KIT ORAL DAILY
COMMUNITY
End: 2021-06-23 | Stop reason: ALTCHOICE

## 2021-06-17 RX ORDER — LABETALOL 100 MG/1
100 TABLET, FILM COATED ORAL 2 TIMES DAILY
COMMUNITY
End: 2021-06-23 | Stop reason: SDUPTHER

## 2021-06-23 ENCOUNTER — OFFICE VISIT (OUTPATIENT)
Dept: FAMILY MEDICINE CLINIC | Facility: CLINIC | Age: 31
End: 2021-06-23

## 2021-06-23 ENCOUNTER — TELEPHONE (OUTPATIENT)
Dept: FAMILY MEDICINE CLINIC | Facility: CLINIC | Age: 31
End: 2021-06-23

## 2021-06-23 VITALS
HEIGHT: 62 IN | SYSTOLIC BLOOD PRESSURE: 104 MMHG | RESPIRATION RATE: 18 BRPM | HEART RATE: 83 BPM | TEMPERATURE: 98.1 F | WEIGHT: 187.6 LBS | OXYGEN SATURATION: 98 % | DIASTOLIC BLOOD PRESSURE: 71 MMHG | BODY MASS INDEX: 34.52 KG/M2

## 2021-06-23 DIAGNOSIS — K21.9 GASTROESOPHAGEAL REFLUX DISEASE WITHOUT ESOPHAGITIS: ICD-10-CM

## 2021-06-23 DIAGNOSIS — Z3A.08 8 WEEKS GESTATION OF PREGNANCY: Primary | ICD-10-CM

## 2021-06-23 DIAGNOSIS — I10 ESSENTIAL HYPERTENSION: ICD-10-CM

## 2021-06-23 DIAGNOSIS — F41.9 ANXIETY: ICD-10-CM

## 2021-06-23 LAB
ALBUMIN SERPL-MCNC: 3.9 G/DL (ref 3.5–5.2)
ALBUMIN/GLOB SERPL: 1.3 G/DL
ALP SERPL-CCNC: 112 U/L (ref 39–117)
ALT SERPL W P-5'-P-CCNC: 17 U/L (ref 1–33)
ANION GAP SERPL CALCULATED.3IONS-SCNC: 12 MMOL/L (ref 5–15)
AST SERPL-CCNC: 22 U/L (ref 1–32)
BASOPHILS # BLD AUTO: 0.07 10*3/MM3 (ref 0–0.2)
BASOPHILS NFR BLD AUTO: 0.5 % (ref 0–1.5)
BILIRUB SERPL-MCNC: 0.3 MG/DL (ref 0–1.2)
BUN SERPL-MCNC: 5 MG/DL (ref 6–20)
BUN/CREAT SERPL: 7.4 (ref 7–25)
CALCIUM SPEC-SCNC: 9.1 MG/DL (ref 8.6–10.5)
CHLORIDE SERPL-SCNC: 101 MMOL/L (ref 98–107)
CHOLEST SERPL-MCNC: 142 MG/DL (ref 0–200)
CO2 SERPL-SCNC: 22 MMOL/L (ref 22–29)
CREAT SERPL-MCNC: 0.68 MG/DL (ref 0.57–1)
DEPRECATED RDW RBC AUTO: 48.1 FL (ref 37–54)
EOSINOPHIL # BLD AUTO: 0.23 10*3/MM3 (ref 0–0.4)
EOSINOPHIL NFR BLD AUTO: 1.6 % (ref 0.3–6.2)
ERYTHROCYTE [DISTWIDTH] IN BLOOD BY AUTOMATED COUNT: 15.9 % (ref 12.3–15.4)
GFR SERPL CREATININE-BSD FRML MDRD: 103 ML/MIN/1.73
GLOBULIN UR ELPH-MCNC: 3 GM/DL
GLUCOSE SERPL-MCNC: 93 MG/DL (ref 65–99)
HCT VFR BLD AUTO: 36.9 % (ref 34–46.6)
HDLC SERPL-MCNC: 44 MG/DL (ref 40–60)
HGB BLD-MCNC: 11.5 G/DL (ref 12–15.9)
IMM GRANULOCYTES # BLD AUTO: 0.07 10*3/MM3 (ref 0–0.05)
IMM GRANULOCYTES NFR BLD AUTO: 0.5 % (ref 0–0.5)
LDLC SERPL CALC-MCNC: 77 MG/DL (ref 0–100)
LDLC/HDLC SERPL: 1.7 {RATIO}
LYMPHOCYTES # BLD AUTO: 3.04 10*3/MM3 (ref 0.7–3.1)
LYMPHOCYTES NFR BLD AUTO: 20.8 % (ref 19.6–45.3)
MCH RBC QN AUTO: 26.1 PG (ref 26.6–33)
MCHC RBC AUTO-ENTMCNC: 31.2 G/DL (ref 31.5–35.7)
MCV RBC AUTO: 83.9 FL (ref 79–97)
MONOCYTES # BLD AUTO: 0.76 10*3/MM3 (ref 0.1–0.9)
MONOCYTES NFR BLD AUTO: 5.2 % (ref 5–12)
NEUTROPHILS NFR BLD AUTO: 10.44 10*3/MM3 (ref 1.7–7)
NEUTROPHILS NFR BLD AUTO: 71.4 % (ref 42.7–76)
NRBC BLD AUTO-RTO: 0 /100 WBC (ref 0–0.2)
PLATELET # BLD AUTO: 358 10*3/MM3 (ref 140–450)
PMV BLD AUTO: 11.2 FL (ref 6–12)
POTASSIUM SERPL-SCNC: 4.1 MMOL/L (ref 3.5–5.2)
PROT SERPL-MCNC: 6.9 G/DL (ref 6–8.5)
RBC # BLD AUTO: 4.4 10*6/MM3 (ref 3.77–5.28)
SODIUM SERPL-SCNC: 135 MMOL/L (ref 136–145)
TRIGL SERPL-MCNC: 117 MG/DL (ref 0–150)
TSH SERPL DL<=0.05 MIU/L-ACNC: 1.36 UIU/ML (ref 0.27–4.2)
VLDLC SERPL-MCNC: 21 MG/DL (ref 5–40)
WBC # BLD AUTO: 14.61 10*3/MM3 (ref 3.4–10.8)

## 2021-06-23 PROCEDURE — 85025 COMPLETE CBC W/AUTO DIFF WBC: CPT | Performed by: NURSE PRACTITIONER

## 2021-06-23 PROCEDURE — 99213 OFFICE O/P EST LOW 20 MIN: CPT | Performed by: NURSE PRACTITIONER

## 2021-06-23 PROCEDURE — 80053 COMPREHEN METABOLIC PANEL: CPT | Performed by: NURSE PRACTITIONER

## 2021-06-23 PROCEDURE — 80061 LIPID PANEL: CPT | Performed by: NURSE PRACTITIONER

## 2021-06-23 PROCEDURE — 84443 ASSAY THYROID STIM HORMONE: CPT | Performed by: NURSE PRACTITIONER

## 2021-06-23 RX ORDER — LABETALOL 100 MG/1
50 TABLET, FILM COATED ORAL DAILY
Qty: 45 TABLET | Refills: 1 | Status: SHIPPED | OUTPATIENT
Start: 2021-06-23 | End: 2022-01-06

## 2021-06-23 RX ORDER — ESCITALOPRAM OXALATE 10 MG/1
10 TABLET ORAL DAILY
Qty: 90 TABLET | Refills: 1 | Status: SHIPPED | OUTPATIENT
Start: 2021-06-23 | End: 2022-01-06 | Stop reason: SDUPTHER

## 2021-06-23 NOTE — TELEPHONE ENCOUNTER
Called pt advised of lab results. Pt verbalized understanding. Pt asked if it is ok to take a Vitamin D supplement while taking prenatal vitamins. Forgot to ask when in the office today. Please advise.

## 2021-06-23 NOTE — PATIENT INSTRUCTIONS
Food Choices for Gastroesophageal Reflux Disease, Adult  When you have gastroesophageal reflux disease (GERD), the foods you eat and your eating habits are very important. Choosing the right foods can help ease your discomfort. Think about working with a food expert (dietitian) to help you make good choices.  What are tips for following this plan?  Reading food labels  · Read the label for foods that are low in saturated fat. Foods that may help with your symptoms include:  ? Foods that have less than 5% of daily value (DV) of fat.  ? Foods that have 0 grams of trans fat.  Cooking  · Do not sutton your food. Cook your food by baking, steaming, grilling, or broiling. These are all methods that do not need a lot of fat for cooking.  · To add flavor, try to use herbs that are low in spice and acidity.  Meal planning    · Choose healthy foods that are low in fat, such as:  ? Fruits and vegetables.  ? Whole grains.  ? Low-fat dairy products.  ? Lean meats, fish, and poultry.  · Eat small meals often instead of eating 3 large meals each day. Eat your meals slowly in a place where you are relaxed. Avoid bending over or lying down until 2-3 hours after eating.  · Limit high-fat foods such as fatty meats or fried foods.  · Limit your intake of oils, butter, and shortening to less than 8 teaspoons each day.  · Avoid the following:  ? Foods that cause symptoms. These may be different for different people. Keep a food diary to keep track of foods that cause symptoms.  ? Alcohol.  ? Drinking a lot of liquid with meals.  ? Eating meals during the 2-3 hours before bed.  Lifestyle  · Stay at a healthy weight. Ask your doctor what weight is healthy for you. If you need to lose weight, work with your doctor to do so safely.  · Exercise for at least 30 minutes on 5 or more days each week, or as told by your doctor.  · Wear loose-fitting clothes.  · Do not use any products that contain nicotine or tobacco, such as cigarettes,  e-cigarettes, and chewing tobacco. If you need help quitting, ask your doctor.  · Sleep with the head of your bed higher than your feet. Use a wedge under the mattress or blocks under the bed frame to raise the head of the bed.  What foods should eat?    Eat a healthy, well-balanced diet of fruits, vegetables, whole grains, low-fat dairy products, lean meats, fish, and poultry. Each person is different. Foods that may cause symptoms in one person may not cause any symptoms in another person. Work with your doctor to find foods that are safe for you.  The items listed above may not be a complete list of foods and beverages you can eat. Contact a dietitian for more information.  What foods should I avoid?  Limiting some of these foods may help in managing the symptoms of GERD. Everyone is different. Talk with a food expert or your doctor to help you find the exact foods to avoid, if any.  Fruits  Any fruits prepared with added fat. Any fruits that cause symptoms. For some people, this may include citrus fruits, such as oranges, grapefruit, pineapple, and mckenzie.  Vegetables  Deep-fried vegetables. French fries. Any vegetables prepared with added fat. Any vegetables that cause symptoms. For some people, this may include tomatoes and tomato products, chili peppers, onions and garlic, and horseradish.  Grains  Pastries or quick breads with added fat.  Meats and other proteins  High-fat meats, such as fatty beef or pork, hot dogs, ribs, ham, sausage, salami, and phelps. Fried meat or protein, including fried fish and fried chicken. Nuts and nut butters.  Dairy  Whole milk and chocolate milk. Sour cream. Cream. Ice cream. Cream cheese. Milkshakes.  Fats and oils  Butter. Margarine. Shortening. Ghee.  Beverages  Coffee and tea, with or without caffeine. Carbonated beverages. Sodas. Energy drinks. Fruit juice made with acidic fruits (such as orange or grapefruit). Tomato juice. Alcoholic drinks.  Sweets and  desserts  Chocolate and cocoa. Donuts.  Seasonings and condiments  Pepper. Peppermint and spearmint. Added salt. Any condiments, herbs, or seasonings that cause symptoms. For some people, this may include tucker, hot sauce, or vinegar-based salad dressings.  The items listed above may not be a complete list of foods and beverages you should avoid. Contact a dietitian for more information.  Questions to ask your doctor  Diet and lifestyle changes are often the first steps that are taken to manage symptoms of GERD. If diet and lifestyle changes do not help, talk with your doctor about taking medicines.  Where to find more information  · International Foundation for Gastrointestinal Disorders: aboutgerd.org  Summary  · When you have GERD, food and lifestyle choices are very important in easing your symptoms.  · Eat small meals often instead of 3 large meals a day. Eat your meals slowly and in a place where you are relaxed.  · Avoid bending over or lying down until 2-3 hours after eating.  · Limit high-fat foods such as fatty meat or fried foods.  This information is not intended to replace advice given to you by your health care provider. Make sure you discuss any questions you have with your health care provider.  Document Revised: 10/12/2020 Document Reviewed: 10/12/2020  Elsevier Patient Education © 2021 Elsevier Inc.

## 2021-06-23 NOTE — PROGRESS NOTES
Chief Complaint  Anxiety, Hypertension (Taking 1/2 tab of BP med currently, checking BP at home.), Depression, and Heartburn    Subjective          Mendoza Jaramillo is a 30 y.o. female who presents to [facility]  History of Present Illness    Anxiety controlled with medication.    Hypertension well controlled patient reduce medication to one half tab.    GERD uncontrolled with Tums alone patient had to restart omeprazole and GERD has now improved.        PHQ-2 Total Score: 0   PHQ-9 Total Score: 0       Review of Systems   Constitutional: Negative for chills, fatigue and fever.   Respiratory: Negative for cough and shortness of breath.    Cardiovascular: Negative for chest pain and palpitations.   Gastrointestinal: Positive for nausea. Negative for constipation, diarrhea and vomiting.        Reflux   Musculoskeletal: Negative for back pain and neck pain.   Skin: Negative for rash.   Neurological: Negative for dizziness and headaches.   Psychiatric/Behavioral: Negative for sleep disturbance and suicidal ideas. The patient is not nervous/anxious.           Medical History: has a past medical history of Abdominal pain, Anxiety, Depression, Esophageal ulcer without bleeding, Essential hypertension, Gastric ulcer, GERD (gastroesophageal reflux disease), Hiatal hernia (04/16/2018), Hypertension, Interstitial cystitis (06/02/2017), Kidney stones, Microhematuria, Overactive bladder (06/02/2017), and Stomach ulcer.     Surgical History: has a past surgical history that includes [place] tooth extraction; Esophagogastroduodenoscopy (05/2017); and Colonoscopy (08/2017).     Family History: family history includes Colon cancer in an other family member; Diabetes in her maternal grandmother; Hypertension in her brother, daughter, father, and paternal grandmother; Nephrolithiasis in her mother; Other in an other family member.     Social History: reports that she has never smoked. She has  "never used smokeless tobacco. She reports current alcohol use. She reports that she does not use drugs.    Allergies: Amoxicillin and Sulfate      Health Maintenance Due   Topic Date Due   • ANNUAL PHYSICAL  Never done   • TDAP/TD VACCINES (1 - Tdap) Never done   • HEPATITIS C SCREENING  Never done   • PAP SMEAR  Never done            Current Outpatient Medications:   •  labetalol (NORMODYNE) 100 MG tablet, Take 0.5 tablets by mouth Daily., Disp: 45 tablet, Rfl: 1  •  Prenatal MV & Min w/FA-DHA (Prenatal Adult Gummy/DHA/FA) 0.4-25 MG chewable tablet, Chew 1 each Daily., Disp: , Rfl:   •  escitalopram (LEXAPRO) 10 MG tablet, Take 1 tablet by mouth Daily., Disp: 90 tablet, Rfl: 1  •  omeprazole (priLOSEC) 20 MG capsule, TAKE 1 CAPSULE BY MOUTH EVERY DAY BEFORE A MEAL, Disp: 30 capsule, Rfl: 3      Immunization History   Administered Date(s) Administered   • COVID-19 (MODERNA) 04/12/2021, 05/10/2021   • Influenza, Unspecified 10/07/2020         Objective       Vitals:    06/23/21 0728   BP: 104/71   Pulse: 83   Resp: 18   Temp: 98.1 °F (36.7 °C)   TempSrc: Temporal   SpO2: 98%   Weight: 85.1 kg (187 lb 9.6 oz)   Height: 157.5 cm (62\")   PainSc: 0-No pain      Body mass index is 34.31 kg/m².   Wt Readings from Last 3 Encounters:   06/23/21 85.1 kg (187 lb 9.6 oz)   06/02/21 84.4 kg (186 lb)   01/27/21 82.6 kg (182 lb 2 oz)      BP Readings from Last 3 Encounters:   06/23/21 104/71   06/02/21 129/77   01/27/21 114/67        Physical Exam  Vitals reviewed.   Constitutional:       Appearance: Normal appearance. She is well-developed.   HENT:      Head: Normocephalic and atraumatic.   Eyes:      Conjunctiva/sclera: Conjunctivae normal.      Pupils: Pupils are equal, round, and reactive to light.   Cardiovascular:      Rate and Rhythm: Normal rate and regular rhythm.      Heart sounds: Normal heart sounds. No murmur heard.     Pulmonary:      Effort: Pulmonary effort is normal.      Breath sounds: Normal breath sounds. No " wheezing or rhonchi.   Abdominal:      General: Bowel sounds are normal. There is no distension.      Palpations: Abdomen is soft.      Tenderness: There is no abdominal tenderness.   Skin:     General: Skin is warm and dry.   Neurological:      Mental Status: She is alert and oriented to person, place, and time.   Psychiatric:         Mood and Affect: Mood and affect normal.         Behavior: Behavior normal.         Thought Content: Thought content normal.         Judgment: Judgment normal.             Result Review :     CMP    CMP 1/27/21 6/4/21   Glucose 99 90   BUN 5 8   Creatinine 0.79 0.81   Sodium 136 137   Potassium 3.9 4.0   Chloride 103 101   Calcium 8.9 9.3   Albumin 4.0    Total Bilirubin 0.45    Alkaline Phosphatase 112 (A)    AST (SGOT) 54 (A)    ALT (SGPT) 49 (A)    (A) Abnormal value                           Assessment and Plan        Diagnoses and all orders for this visit:    1. 8 weeks gestation of pregnancy (Primary)    2. Anxiety  -     escitalopram (LEXAPRO) 10 MG tablet; Take 1 tablet by mouth Daily.  Dispense: 90 tablet; Refill: 1    3. Gastroesophageal reflux disease without esophagitis    4. Essential hypertension  -     labetalol (NORMODYNE) 100 MG tablet; Take 0.5 tablets by mouth Daily.  Dispense: 45 tablet; Refill: 1          Follow Up     Return in about 6 months (around 12/23/2021) for Next scheduled follow up.    Patient was given instructions and counseling regarding her condition or for health maintenance advice. Please see specific information pulled into the AVS if appropriate.     CANDIDA Holbrook

## 2021-06-23 NOTE — TELEPHONE ENCOUNTER
----- Message from CANDIDA Holbrook sent at 6/23/2021  1:13 PM EDT -----  White blood cell count elevated likely due to recent UTI.  If symptoms are unresolved recommend repeat urinalysis.Thyroid and cholesterol levels within normal limits.

## 2021-06-28 ENCOUNTER — HOSPITAL ENCOUNTER (EMERGENCY)
Facility: HOSPITAL | Age: 31
Discharge: HOME | End: 2021-06-28
Attending: EMERGENCY MEDICINE | Admitting: EMERGENCY MEDICINE

## 2021-06-28 ENCOUNTER — APPOINTMENT (OUTPATIENT)
Dept: CARDIOLOGY | Facility: HOSPITAL | Age: 31
End: 2021-06-28

## 2021-06-28 ENCOUNTER — APPOINTMENT (OUTPATIENT)
Dept: GENERAL RADIOLOGY | Facility: HOSPITAL | Age: 31
End: 2021-06-28

## 2021-06-28 VITALS
BODY MASS INDEX: 34.44 KG/M2 | DIASTOLIC BLOOD PRESSURE: 60 MMHG | SYSTOLIC BLOOD PRESSURE: 118 MMHG | WEIGHT: 187.17 LBS | OXYGEN SATURATION: 97 % | HEIGHT: 62 IN | TEMPERATURE: 98.4 F | RESPIRATION RATE: 16 BRPM | HEART RATE: 84 BPM

## 2021-06-28 DIAGNOSIS — J20.9 ACUTE BRONCHITIS, UNSPECIFIED ORGANISM: Primary | ICD-10-CM

## 2021-06-28 LAB
ALBUMIN SERPL-MCNC: 3.9 G/DL (ref 3.5–5.2)
ALBUMIN/GLOB SERPL: 1.3 G/DL
ALP SERPL-CCNC: 112 U/L (ref 39–117)
ALT SERPL W P-5'-P-CCNC: 17 U/L (ref 1–33)
ANION GAP SERPL CALCULATED.3IONS-SCNC: 10.7 MMOL/L (ref 5–15)
AST SERPL-CCNC: 16 U/L (ref 1–32)
BASOPHILS # BLD AUTO: 0.04 10*3/MM3 (ref 0–0.2)
BASOPHILS NFR BLD AUTO: 0.3 % (ref 0–1.5)
BH CV LOWER VASCULAR LEFT COMMON FEMORAL AUGMENT: NORMAL
BH CV LOWER VASCULAR LEFT COMMON FEMORAL COMPETENT: NORMAL
BH CV LOWER VASCULAR LEFT COMMON FEMORAL COMPRESS: NORMAL
BH CV LOWER VASCULAR LEFT COMMON FEMORAL PHASIC: NORMAL
BH CV LOWER VASCULAR LEFT COMMON FEMORAL SPONT: NORMAL
BH CV LOWER VASCULAR RIGHT COMMON FEMORAL AUGMENT: NORMAL
BH CV LOWER VASCULAR RIGHT COMMON FEMORAL COMPETENT: NORMAL
BH CV LOWER VASCULAR RIGHT COMMON FEMORAL COMPRESS: NORMAL
BH CV LOWER VASCULAR RIGHT COMMON FEMORAL PHASIC: NORMAL
BH CV LOWER VASCULAR RIGHT COMMON FEMORAL SPONT: NORMAL
BH CV LOWER VASCULAR RIGHT DISTAL FEMORAL COMPRESS: NORMAL
BH CV LOWER VASCULAR RIGHT GREATER SAPH AK COMPRESS: NORMAL
BH CV LOWER VASCULAR RIGHT GREATER SAPH BK COMPRESS: NORMAL
BH CV LOWER VASCULAR RIGHT LESSER SAPH COMPRESS: NORMAL
BH CV LOWER VASCULAR RIGHT MID FEMORAL AUGMENT: NORMAL
BH CV LOWER VASCULAR RIGHT MID FEMORAL COMPETENT: NORMAL
BH CV LOWER VASCULAR RIGHT MID FEMORAL COMPRESS: NORMAL
BH CV LOWER VASCULAR RIGHT MID FEMORAL PHASIC: NORMAL
BH CV LOWER VASCULAR RIGHT MID FEMORAL SPONT: NORMAL
BH CV LOWER VASCULAR RIGHT PERONEAL COMPRESS: NORMAL
BH CV LOWER VASCULAR RIGHT POPLITEAL AUGMENT: NORMAL
BH CV LOWER VASCULAR RIGHT POPLITEAL COMPETENT: NORMAL
BH CV LOWER VASCULAR RIGHT POPLITEAL COMPRESS: NORMAL
BH CV LOWER VASCULAR RIGHT POPLITEAL PHASIC: NORMAL
BH CV LOWER VASCULAR RIGHT POPLITEAL SPONT: NORMAL
BH CV LOWER VASCULAR RIGHT POSTERIOR TIBIAL COMPRESS: NORMAL
BH CV LOWER VASCULAR RIGHT PROXIMAL FEMORAL COMPRESS: NORMAL
BH CV LOWER VASCULAR RIGHT SAPHENOFEMORAL JUNCTION COMPRESS: NORMAL
BILIRUB SERPL-MCNC: 0.3 MG/DL (ref 0–1.2)
BUN SERPL-MCNC: 5 MG/DL (ref 6–20)
BUN/CREAT SERPL: 7.4 (ref 7–25)
CALCIUM SPEC-SCNC: 8.9 MG/DL (ref 8.6–10.5)
CHLORIDE SERPL-SCNC: 103 MMOL/L (ref 98–107)
CO2 SERPL-SCNC: 22.3 MMOL/L (ref 22–29)
CREAT SERPL-MCNC: 0.68 MG/DL (ref 0.57–1)
D DIMER PPP FEU-MCNC: 0.29 MG/L (FEU) (ref 0–0.59)
DEPRECATED RDW RBC AUTO: 47.8 FL (ref 37–54)
EOSINOPHIL # BLD AUTO: 0.14 10*3/MM3 (ref 0–0.4)
EOSINOPHIL NFR BLD AUTO: 1.1 % (ref 0.3–6.2)
ERYTHROCYTE [DISTWIDTH] IN BLOOD BY AUTOMATED COUNT: 15.9 % (ref 12.3–15.4)
GFR SERPL CREATININE-BSD FRML MDRD: 103 ML/MIN/1.73
GLOBULIN UR ELPH-MCNC: 3.1 GM/DL
GLUCOSE SERPL-MCNC: 89 MG/DL (ref 65–99)
HCT VFR BLD AUTO: 36.2 % (ref 34–46.6)
HGB BLD-MCNC: 11.8 G/DL (ref 12–15.9)
IMM GRANULOCYTES # BLD AUTO: 0.05 10*3/MM3 (ref 0–0.05)
IMM GRANULOCYTES NFR BLD AUTO: 0.4 % (ref 0–0.5)
INR PPP: 0.88 (ref 2–3)
LYMPHOCYTES # BLD AUTO: 2.3 10*3/MM3 (ref 0.7–3.1)
LYMPHOCYTES NFR BLD AUTO: 18.6 % (ref 19.6–45.3)
MAXIMAL PREDICTED HEART RATE: 192 BPM
MCH RBC QN AUTO: 27 PG (ref 26.6–33)
MCHC RBC AUTO-ENTMCNC: 32.6 G/DL (ref 31.5–35.7)
MCV RBC AUTO: 82.8 FL (ref 79–97)
MONOCYTES # BLD AUTO: 0.53 10*3/MM3 (ref 0.1–0.9)
MONOCYTES NFR BLD AUTO: 4.3 % (ref 5–12)
NEUTROPHILS NFR BLD AUTO: 75.3 % (ref 42.7–76)
NEUTROPHILS NFR BLD AUTO: 9.31 10*3/MM3 (ref 1.7–7)
NRBC BLD AUTO-RTO: 0 /100 WBC (ref 0–0.2)
PLATELET # BLD AUTO: 321 10*3/MM3 (ref 140–450)
PMV BLD AUTO: 10.4 FL (ref 6–12)
POTASSIUM SERPL-SCNC: 3.9 MMOL/L (ref 3.5–5.2)
PROT SERPL-MCNC: 7 G/DL (ref 6–8.5)
PROTHROMBIN TIME: 9.9 SECONDS (ref 9.4–12)
RBC # BLD AUTO: 4.37 10*6/MM3 (ref 3.77–5.28)
SODIUM SERPL-SCNC: 136 MMOL/L (ref 136–145)
STRESS TARGET HR: 163 BPM
WBC # BLD AUTO: 12.37 10*3/MM3 (ref 3.4–10.8)

## 2021-06-28 PROCEDURE — 85610 PROTHROMBIN TIME: CPT | Performed by: NURSE PRACTITIONER

## 2021-06-28 PROCEDURE — 36415 COLL VENOUS BLD VENIPUNCTURE: CPT

## 2021-06-28 PROCEDURE — 93971 EXTREMITY STUDY: CPT | Performed by: SURGERY

## 2021-06-28 PROCEDURE — 71045 X-RAY EXAM CHEST 1 VIEW: CPT

## 2021-06-28 PROCEDURE — 93971 EXTREMITY STUDY: CPT

## 2021-06-28 PROCEDURE — 99282 EMERGENCY DEPT VISIT SF MDM: CPT

## 2021-06-28 PROCEDURE — 85379 FIBRIN DEGRADATION QUANT: CPT | Performed by: NURSE PRACTITIONER

## 2021-06-28 PROCEDURE — 80053 COMPREHEN METABOLIC PANEL: CPT | Performed by: NURSE PRACTITIONER

## 2021-06-28 PROCEDURE — 85025 COMPLETE CBC W/AUTO DIFF WBC: CPT | Performed by: NURSE PRACTITIONER

## 2021-06-28 RX ORDER — CEPHALEXIN 500 MG/1
500 CAPSULE ORAL 4 TIMES DAILY
Qty: 28 CAPSULE | Refills: 0 | Status: SHIPPED | OUTPATIENT
Start: 2021-06-28 | End: 2021-07-05

## 2021-07-15 VITALS
RESPIRATION RATE: 18 BRPM | SYSTOLIC BLOOD PRESSURE: 129 MMHG | TEMPERATURE: 98.1 F | HEIGHT: 62 IN | HEART RATE: 91 BPM | WEIGHT: 186 LBS | OXYGEN SATURATION: 97 % | BODY MASS INDEX: 34.23 KG/M2 | DIASTOLIC BLOOD PRESSURE: 77 MMHG

## 2021-09-23 ENCOUNTER — ROUTINE PRENATAL (OUTPATIENT)
Dept: OBSTETRICS AND GYNECOLOGY | Facility: CLINIC | Age: 31
End: 2021-09-23

## 2021-09-23 VITALS — WEIGHT: 191 LBS | SYSTOLIC BLOOD PRESSURE: 115 MMHG | BODY MASS INDEX: 34.93 KG/M2 | DIASTOLIC BLOOD PRESSURE: 78 MMHG

## 2021-09-23 DIAGNOSIS — Z34.80 SUPERVISION OF OTHER NORMAL PREGNANCY, ANTEPARTUM: ICD-10-CM

## 2021-09-23 DIAGNOSIS — O10.919 CHRONIC HYPERTENSION AFFECTING PREGNANCY: Primary | ICD-10-CM

## 2021-09-23 PROCEDURE — 0502F SUBSEQUENT PRENATAL CARE: CPT | Performed by: OBSTETRICS & GYNECOLOGY

## 2021-09-23 NOTE — PROGRESS NOTES
Chief Complaint: Scheduled visit    HPI: 30 y.o.  at 20w5d   Ultrasound today confirmed posterior placenta  No anatomic abnormalities noted  Positive baby movement    Vitals:    21 1125   BP: 115/78   Weight: 86.6 kg (191 lb)       See OB flowsheet also for pregnancy related data.  Fetal heart rate per ultrasound  Fundal height appropriate 21 cm      A/P  1. Intrauterine pregnancy at 20w5d   2. Pregnancy Risk:  COMPLICATED  Complicated by chronic hypertension      Diagnoses and all orders for this visit:    1. Chronic hypertension affecting pregnancy (Primary)    2. Supervision of other normal pregnancy, antepartum    We discussed the need for serial ultrasounds at 26, 30, 34 weeks for growth due to chronic hypertension.  Continue labetalol 50 mg daily  Glucola next visit    Encouraged fetal kick counts, 10 movements in 2 hours every day.  To labor and delivery if lack fetal movement  Discussed Covid vaccination in pregnancy including CDC guidelines.  Vaccination strongly encouraged with risk of potential severe illness in unvaccinated.    Pre-eclampsia symptoms were discussed and warnings were given.  -----------------------  PLAN:   Return in about 4 weeks (around 10/21/2021).    Franco Cardona Sr., MD  2021 12:10 EDT

## 2021-10-07 RX ORDER — OMEPRAZOLE 20 MG/1
CAPSULE, DELAYED RELEASE ORAL
Qty: 30 CAPSULE | Refills: 3 | Status: SHIPPED | OUTPATIENT
Start: 2021-10-07 | End: 2022-02-16

## 2021-10-20 ENCOUNTER — ROUTINE PRENATAL (OUTPATIENT)
Dept: OBSTETRICS AND GYNECOLOGY | Facility: CLINIC | Age: 31
End: 2021-10-20

## 2021-10-20 VITALS — BODY MASS INDEX: 36.03 KG/M2 | WEIGHT: 197 LBS | SYSTOLIC BLOOD PRESSURE: 115 MMHG | DIASTOLIC BLOOD PRESSURE: 83 MMHG

## 2021-10-20 DIAGNOSIS — Z34.80 SUPERVISION OF OTHER NORMAL PREGNANCY, ANTEPARTUM: Primary | ICD-10-CM

## 2021-10-20 DIAGNOSIS — E66.9 OBESITY (BMI 30-39.9): ICD-10-CM

## 2021-10-20 DIAGNOSIS — O10.919 CHRONIC HYPERTENSION AFFECTING PREGNANCY: ICD-10-CM

## 2021-10-20 LAB
DEPRECATED RDW RBC AUTO: 39.8 FL (ref 37–54)
ERYTHROCYTE [DISTWIDTH] IN BLOOD BY AUTOMATED COUNT: 14 % (ref 12.3–15.4)
GLUCOSE 1H P GLC SERPL-MCNC: 146 MG/DL (ref 65–139)
GLUCOSE UR STRIP-MCNC: NEGATIVE MG/DL
HCT VFR BLD AUTO: 31.6 % (ref 34–46.6)
HGB BLD-MCNC: 10.2 G/DL (ref 12–15.9)
MCH RBC QN AUTO: 26.3 PG (ref 26.6–33)
MCHC RBC AUTO-ENTMCNC: 32.3 G/DL (ref 31.5–35.7)
MCV RBC AUTO: 81.4 FL (ref 79–97)
PLATELET # BLD AUTO: 341 10*3/MM3 (ref 140–450)
PMV BLD AUTO: 11 FL (ref 6–12)
PROT UR STRIP-MCNC: NEGATIVE MG/DL
RBC # BLD AUTO: 3.88 10*6/MM3 (ref 3.77–5.28)
WBC # BLD AUTO: 15.76 10*3/MM3 (ref 3.4–10.8)

## 2021-10-20 PROCEDURE — 82950 GLUCOSE TEST: CPT | Performed by: OBSTETRICS & GYNECOLOGY

## 2021-10-20 PROCEDURE — 85027 COMPLETE CBC AUTOMATED: CPT | Performed by: OBSTETRICS & GYNECOLOGY

## 2021-10-20 PROCEDURE — 0502F SUBSEQUENT PRENATAL CARE: CPT | Performed by: OBSTETRICS & GYNECOLOGY

## 2021-10-21 ENCOUNTER — TELEPHONE (OUTPATIENT)
Dept: OBSTETRICS AND GYNECOLOGY | Facility: CLINIC | Age: 31
End: 2021-10-21

## 2021-10-21 DIAGNOSIS — O99.810 IMPAIRED GLUCOSE IN PREGNANCY, ANTEPARTUM: Primary | ICD-10-CM

## 2021-10-21 NOTE — TELEPHONE ENCOUNTER
----- Message from Franco Cardona Sr., MD sent at 10/20/2021  5:27 PM EDT -----  Need 3-hour GTT  ----- Message -----  From: Dedra Napoles MA  Sent: 10/20/2021   9:50 AM EDT  To: Franco Cardona Sr., MD

## 2021-10-21 NOTE — TELEPHONE ENCOUNTER
Discussed results with pt. Let her know we would work on getting her scheduled for her 3 hr gtt and call her back.

## 2021-10-21 NOTE — TELEPHONE ENCOUNTER
----- Message from Franco Cardona Sr., MD sent at 10/21/2021 12:33 PM EDT -----  Patient needs iron prescription  ----- Message -----  From: Dedra Napoles MA  Sent: 10/20/2021   9:50 AM EDT  To: Franco Cardona Sr., MD

## 2021-11-04 ENCOUNTER — HOSPITAL ENCOUNTER (OUTPATIENT)
Dept: ULTRASOUND IMAGING | Facility: HOSPITAL | Age: 31
Discharge: HOME | End: 2021-11-04
Admitting: OBSTETRICS & GYNECOLOGY

## 2021-11-04 DIAGNOSIS — O10.919 CHRONIC HYPERTENSION AFFECTING PREGNANCY: ICD-10-CM

## 2021-11-04 PROCEDURE — 76805 OB US >/= 14 WKS SNGL FETUS: CPT

## 2021-11-18 ENCOUNTER — TELEPHONE (OUTPATIENT)
Dept: OBSTETRICS AND GYNECOLOGY | Facility: CLINIC | Age: 31
End: 2021-11-18

## 2021-11-18 ENCOUNTER — ROUTINE PRENATAL (OUTPATIENT)
Dept: OBSTETRICS AND GYNECOLOGY | Facility: CLINIC | Age: 31
End: 2021-11-18

## 2021-11-18 VITALS — SYSTOLIC BLOOD PRESSURE: 133 MMHG | BODY MASS INDEX: 36.76 KG/M2 | WEIGHT: 201 LBS | DIASTOLIC BLOOD PRESSURE: 79 MMHG

## 2021-11-18 DIAGNOSIS — E66.9 OBESITY (BMI 30-39.9): ICD-10-CM

## 2021-11-18 DIAGNOSIS — Z34.80 SUPERVISION OF OTHER NORMAL PREGNANCY, ANTEPARTUM: Primary | ICD-10-CM

## 2021-11-18 DIAGNOSIS — O10.919 CHRONIC HYPERTENSION AFFECTING PREGNANCY: ICD-10-CM

## 2021-11-18 LAB
GLUCOSE UR STRIP-MCNC: NEGATIVE MG/DL
PROT UR STRIP-MCNC: NEGATIVE MG/DL

## 2021-11-18 PROCEDURE — 0502F SUBSEQUENT PRENATAL CARE: CPT | Performed by: OBSTETRICS & GYNECOLOGY

## 2021-11-18 RX ORDER — FERROUS SULFATE 325(65) MG
1 TABLET ORAL EVERY OTHER DAY
COMMUNITY
Start: 2021-10-22 | End: 2022-08-15

## 2021-11-18 NOTE — PROGRESS NOTES
Chief Complaint:  Scheduled OB visit    HPI: 30 y.o.  at 28w5d   Good fetal movement  Not alberto.    Vitals:    21 0936   BP: 133/79   Weight: 91.2 kg (201 lb)     Reviewed prior ultrasound.  Estimated fetal weight 89th percentile.  Rh positive.  Blood pressure 133/79  See OB flowsheet also for pregnancy related data.    A/P  Intrauterine pregnancy at 28w5d   Diagnoses and all orders for this visit:    1. Supervision of other normal pregnancy, antepartum (Primary)  -     POC Urinalysis Dipstick    2. Chronic hypertension affecting pregnancy  Continue labetalol 50 mg daily  Growth ultrasound ordered in approximately 3 weeks.    3. Obesity (BMI 30-39.9)    Repeat ultrasound in approximately 4 weeks for growth secondary to chronic hypertension.      Continue prenatal vitamins.  Encouraged fetal kick counts, 10 movements in 2 hours every day.  To labor and delivery if lack fetal movement    PLAN:   Return in about 3 weeks (around 2021).    Franco Cardona Sr., MD  2021 10:21 EST

## 2021-11-29 ENCOUNTER — TELEPHONE (OUTPATIENT)
Dept: OBSTETRICS AND GYNECOLOGY | Facility: CLINIC | Age: 31
End: 2021-11-29

## 2021-11-29 ENCOUNTER — ROUTINE PRENATAL (OUTPATIENT)
Dept: OBSTETRICS AND GYNECOLOGY | Facility: CLINIC | Age: 31
End: 2021-11-29

## 2021-11-29 VITALS — SYSTOLIC BLOOD PRESSURE: 116 MMHG | DIASTOLIC BLOOD PRESSURE: 81 MMHG | WEIGHT: 207 LBS | BODY MASS INDEX: 37.86 KG/M2

## 2021-11-29 DIAGNOSIS — Z34.80 SUPERVISION OF OTHER NORMAL PREGNANCY, ANTEPARTUM: Primary | ICD-10-CM

## 2021-11-29 DIAGNOSIS — O26.86 PUPP (PRURITIC URTICARIAL PAPULES AND PLAQUES OF PREGNANCY): ICD-10-CM

## 2021-11-29 LAB
GLUCOSE UR STRIP-MCNC: NEGATIVE MG/DL
PROT UR STRIP-MCNC: NEGATIVE MG/DL

## 2021-11-29 PROCEDURE — 0502F SUBSEQUENT PRENATAL CARE: CPT | Performed by: OBSTETRICS & GYNECOLOGY

## 2021-11-29 NOTE — TELEPHONE ENCOUNTER
Patient called with complaints of a rash on her low stomach in her stretch marks. The rash is very itchy and has gotten worse since first started. The stretch marks are raised and red. She started to notice it on Thursday and moisturizing products do not help. Heat makes the itching worse. She is currently 30 weeks. Her next visit for for an ultrasound 12/6 and follow up with Dr. Ascencio on 12/9. She is off today if she needs to be seen. Please advise.

## 2021-11-29 NOTE — PROGRESS NOTES
OB with CC      CC:  Rash and itching     HPI:   30 y.o.  30w2d arrives with above CC.    Started about 4days ago, rash is in stretch marks, and very itchy despite lotion.  No other complaints.  Today is not her routine OB OV.    Patient has: No leaking fluid, No vaginal bleeding, No contractions, Adequate FM    History: PMHx, Meds, Allergies, PSHx, Social Hx, and POBHx all reviewed and updated.    PHYSICAL EXAM:  /81   Wt 93.9 kg (207 lb)   LMP 2021 (Exact Date)   BMI 37.86 kg/m²   General- NAD, alert and oriented, appropriate  Psych- Normal mood, good memory, good eye contact  Abdomen- Consistent with dates, non distended, non tender, no masses  Red papules in stretch marks on abdomen, sparing umbilicus.  Ext- No cyanosis    Skin- No lesions, no rashes, no acanthosis nigricans    Assessment and Plan:  30w2d  Incidental Pregnancy with above CC    Diagnoses and all orders for this visit:    1. Supervision of other normal pregnancy, antepartum (Primary)  -     POC Urinalysis Dipstick    2. PUPP (pruritic urticarial papules and plaques of pregnancy)      Counseling: Return to office if symptoms persist or return, OTC benadryl, aveeno baths, hydrocortisone OTC options    Return in about 1 week (around 2021) as scheduled 9Dec w Dr. Ascencio, then d4oxzif to 36weeks, then weekly to 39weeks.            Diana Smyth, DO  2021    [facility]  [address] [place] [address]  Dept: [phone]  Dept Fax: [phone]  Loc: [phone]  Loc Fax: [phone]

## 2021-12-06 ENCOUNTER — HOSPITAL ENCOUNTER (OUTPATIENT)
Dept: ULTRASOUND IMAGING | Facility: HOSPITAL | Age: 31
Discharge: HOME | End: 2021-12-06
Admitting: OBSTETRICS & GYNECOLOGY

## 2021-12-06 DIAGNOSIS — E66.9 OBESITY (BMI 30-39.9): ICD-10-CM

## 2021-12-06 DIAGNOSIS — O10.919 CHRONIC HYPERTENSION AFFECTING PREGNANCY: ICD-10-CM

## 2021-12-06 PROCEDURE — 76805 OB US >/= 14 WKS SNGL FETUS: CPT

## 2021-12-09 ENCOUNTER — ROUTINE PRENATAL (OUTPATIENT)
Dept: OBSTETRICS AND GYNECOLOGY | Facility: CLINIC | Age: 31
End: 2021-12-09

## 2021-12-09 ENCOUNTER — HOSPITAL ENCOUNTER (OUTPATIENT)
Facility: HOSPITAL | Age: 31
Discharge: HOME | End: 2021-12-09
Attending: OBSTETRICS & GYNECOLOGY | Admitting: STUDENT IN AN ORGANIZED HEALTH CARE EDUCATION/TRAINING PROGRAM

## 2021-12-09 VITALS — HEART RATE: 71 BPM | SYSTOLIC BLOOD PRESSURE: 121 MMHG | DIASTOLIC BLOOD PRESSURE: 73 MMHG

## 2021-12-09 VITALS — SYSTOLIC BLOOD PRESSURE: 144 MMHG | DIASTOLIC BLOOD PRESSURE: 98 MMHG | WEIGHT: 210 LBS | BODY MASS INDEX: 38.41 KG/M2

## 2021-12-09 DIAGNOSIS — O26.86 PUPP (PRURITIC URTICARIAL PAPULES AND PLAQUES OF PREGNANCY): ICD-10-CM

## 2021-12-09 DIAGNOSIS — O10.919 CHRONIC HYPERTENSION AFFECTING PREGNANCY: ICD-10-CM

## 2021-12-09 DIAGNOSIS — Z34.80 SUPERVISION OF OTHER NORMAL PREGNANCY, ANTEPARTUM: Primary | ICD-10-CM

## 2021-12-09 PROBLEM — R10.9 ABDOMINAL PAIN: Status: RESOLVED | Noted: 2021-06-17 | Resolved: 2021-12-09

## 2021-12-09 LAB
ALBUMIN SERPL-MCNC: 3.1 G/DL (ref 3.5–5.2)
ALBUMIN/GLOB SERPL: 1 G/DL
ALP SERPL-CCNC: 115 U/L (ref 39–117)
ALT SERPL W P-5'-P-CCNC: 18 U/L (ref 1–33)
ANION GAP SERPL CALCULATED.3IONS-SCNC: 11.1 MMOL/L (ref 5–15)
AST SERPL-CCNC: 22 U/L (ref 1–32)
BILIRUB SERPL-MCNC: 0.2 MG/DL (ref 0–1.2)
BUN SERPL-MCNC: 6 MG/DL (ref 6–20)
BUN/CREAT SERPL: 8.7 (ref 7–25)
CALCIUM SPEC-SCNC: 8.8 MG/DL (ref 8.6–10.5)
CHLORIDE SERPL-SCNC: 103 MMOL/L (ref 98–107)
CO2 SERPL-SCNC: 19.9 MMOL/L (ref 22–29)
CREAT SERPL-MCNC: 0.69 MG/DL (ref 0.57–1)
CREAT UR-MCNC: 255.4 MG/DL
DEPRECATED RDW RBC AUTO: 44.6 FL (ref 37–54)
ERYTHROCYTE [DISTWIDTH] IN BLOOD BY AUTOMATED COUNT: 15.5 % (ref 12.3–15.4)
GFR SERPL CREATININE-BSD FRML MDRD: 101 ML/MIN/1.73
GLOBULIN UR ELPH-MCNC: 3.2 GM/DL
GLUCOSE SERPL-MCNC: 88 MG/DL (ref 65–99)
GLUCOSE UR STRIP-MCNC: NEGATIVE MG/DL
HCT VFR BLD AUTO: 32.5 % (ref 34–46.6)
HGB BLD-MCNC: 10.7 G/DL (ref 12–15.9)
MCH RBC QN AUTO: 26.2 PG (ref 26.6–33)
MCHC RBC AUTO-ENTMCNC: 32.9 G/DL (ref 31.5–35.7)
MCV RBC AUTO: 79.5 FL (ref 79–97)
PLATELET # BLD AUTO: 306 10*3/MM3 (ref 140–450)
PMV BLD AUTO: 11 FL (ref 6–12)
POTASSIUM SERPL-SCNC: 4 MMOL/L (ref 3.5–5.2)
PROT ?TM UR-MCNC: 37.6 MG/DL
PROT SERPL-MCNC: 6.3 G/DL (ref 6–8.5)
PROT UR STRIP-MCNC: ABNORMAL MG/DL
PROT/CREAT UR: 0.1 MG/G{CREAT}
RBC # BLD AUTO: 4.09 10*6/MM3 (ref 3.77–5.28)
SODIUM SERPL-SCNC: 134 MMOL/L (ref 136–145)
WBC NRBC COR # BLD: 13.08 10*3/MM3 (ref 3.4–10.8)

## 2021-12-09 PROCEDURE — 85027 COMPLETE CBC AUTOMATED: CPT | Performed by: STUDENT IN AN ORGANIZED HEALTH CARE EDUCATION/TRAINING PROGRAM

## 2021-12-09 PROCEDURE — 82570 ASSAY OF URINE CREATININE: CPT | Performed by: STUDENT IN AN ORGANIZED HEALTH CARE EDUCATION/TRAINING PROGRAM

## 2021-12-09 PROCEDURE — 84156 ASSAY OF PROTEIN URINE: CPT | Performed by: STUDENT IN AN ORGANIZED HEALTH CARE EDUCATION/TRAINING PROGRAM

## 2021-12-09 PROCEDURE — 0502F SUBSEQUENT PRENATAL CARE: CPT | Performed by: STUDENT IN AN ORGANIZED HEALTH CARE EDUCATION/TRAINING PROGRAM

## 2021-12-09 PROCEDURE — 80053 COMPREHEN METABOLIC PANEL: CPT | Performed by: STUDENT IN AN ORGANIZED HEALTH CARE EDUCATION/TRAINING PROGRAM

## 2021-12-09 PROCEDURE — 59025 FETAL NON-STRESS TEST: CPT

## 2021-12-09 PROCEDURE — 59025 FETAL NON-STRESS TEST: CPT | Performed by: OBSTETRICS & GYNECOLOGY

## 2021-12-09 NOTE — PROGRESS NOTES
OB FOLLOW UP  Complaint   Chief Complaint   Patient presents with   • Routine Prenatal Visit            Mendoza Jaramillo is a 30 y.o.  31w5d patient being seen today for her obstetrical follow up visit. Patient denies decreased fetal movement, contractions, loss of fluid or vaginal bleeding.  Patient denies any headache, visual disturbances, new onset nausea vomiting, right upper quadrant pain. Does endorse new onset swelling of lower extremities. Has been compliant with 50mg Labetalol for CHTN.       Her prenatal care is complicated by (and status) :    Patient Active Problem List   Diagnosis   • Accelerated hypertension   • Acute depression   • Anxiety   • GERD (gastroesophageal reflux disease)   • Essential hypertension   • Family history of fatty liver   • Hiatal hernia   • Overactive bladder   • Interstitial cystitis   • Kidney stones   • Microhematuria   • Chronic hypertension affecting pregnancy   • Supervision of other normal pregnancy, antepartum   • Obesity (BMI 30-39.9)   • PUPP (pruritic urticarial papules and plaques of pregnancy)       All other systems reviewed and are negative.     The additional following portions of the patient's history were reviewed and updated as appropriate: allergies, current medications, past family history, past medical history, past social history, past surgical history and problem list.      EXAM:     Vital signs: /98   Wt 95.3 kg (210 lb)   LMP 2021 (Exact Date)   BMI 38.41 kg/m²   Appearance/psychiatric: To be in no distress  Constitutional: The patient is well nourished.  Cardiovascular: She does not have edema.  Respiratory: Respiratory effort is normal.  Gastrointestinal: Abdomen is soft, gravid, nontender, no rashes, heart tones are present, fundal height is size equals dates    Pelvic Exam: No    Urine glucose/protein: See prenatal flowsheet       Assessment and Plan    Problem List Items Addressed This Visit        Gravid and      Supervision of other normal pregnancy, antepartum - Primary    Relevant Orders    POC Urinalysis Dipstick (Completed)    PUPP (pruritic urticarial papules and plaques of pregnancy)    Chronic hypertension affecting pregnancy    Relevant Medications    labetalol (NORMODYNE) 100 MG tablet          Impression  1. Pregnancy at 31w5d  2. Fetal status reassuring.   3. Activity and Exercise discussed.    Plan  1.  To labor and delivery for labs and NST.  On-call doc notified as well as triage.  2.  Follow-up 2 weeks    Patient was counseled to the following pregnancy precautions:  • Decreased fetal movement, if concern for decreased fetal movement please perform fetal kick counts you are looking for 10 movements in 2 hours.  If concern for fetal movement and not meeting that criteria, please present to triage for evaluation.  • Contractions occurring every 5 minutes for over an hour, lasting 30 to 60 seconds and progressively causing more discomfort, please seek medical attention to rule out labor  • If you believe that your water is broken, place a sanitary pad.  If pad fills in short period of time i.e. less than 5 minutes, take off pad placed another pad.  If this is saturated please present for rule out rupture of membranes  • Vaginal bleeding can be normal in pregnancy, this usually takes a form of spotting.  If having heavier bleeding like a menstrual period please present for evaluation; especially in light of severe abdominal pain this could represent a placental abruption.  • Keep all scheduled appointments as recommended.        Colton Ascencio MD  12/09/2021

## 2021-12-09 NOTE — SIGNIFICANT NOTE
31w5d  /71   Pulse 70   LMP 04/11/2021 (Exact Date)   Reason for test: (P) Hypertension  Date of Test: 12/9/2021  Time frame of test: 0369-0396  RN NST Interpretation: (P) Reactive     12/09/21 1233   Nonstress Test   Reason for NST Hypertension   Acoustic Stimulator No   Uterine Irritability No   Contractions Not present   Fetal Assessment   Fetal Movement absent   Fetal HR Assessment Method external   Fetal HR (beats/min) 140   Fetal Heart Baseline Rate normal range   Fetal HR Variability moderate (amplitude range 6 to 25 bpm)   Fetal HR Accelerations lasting at least 15 seconds; greater than/equal to 15 bpm   Fetal HR Decelerations absent   Sinusoidal Pattern Present absent   Interpretation A   Nonstress Test Interpretation A Reactive

## 2021-12-09 NOTE — PROGRESS NOTES
Obstetrical Non-stress Test Interpretation     Name:  Mendoza Jaramillo  MRN: [id number]    30 y.o. female  at 31w5d    Indication: Chronic hypertension    Weeks Gestation: 31w5d     Baseline: 150 BPM  Variability:   Moderate/Normal (amplitude 6-25 bpm)  Accelerations: Present (32 weeks+) 15 x 15 bpm  Decelerations: Absent   Contractions:  Absent     PC ratio 0.1    Impression:  Reactive NST, no severe range blood pressures  Chronic hypertension    Plan: Discharge to home.  Keep follow up per office instructions.      Franco Cardona Sr., MD  2021  17:17 EST

## 2021-12-20 ENCOUNTER — HOSPITAL ENCOUNTER (OUTPATIENT)
Facility: HOSPITAL | Age: 31
Discharge: HOME | End: 2021-12-20
Attending: STUDENT IN AN ORGANIZED HEALTH CARE EDUCATION/TRAINING PROGRAM | Admitting: OBSTETRICS & GYNECOLOGY

## 2021-12-20 ENCOUNTER — TELEPHONE (OUTPATIENT)
Dept: OBSTETRICS AND GYNECOLOGY | Facility: CLINIC | Age: 31
End: 2021-12-20

## 2021-12-20 ENCOUNTER — ROUTINE PRENATAL (OUTPATIENT)
Dept: OBSTETRICS AND GYNECOLOGY | Facility: CLINIC | Age: 31
End: 2021-12-20

## 2021-12-20 VITALS — DIASTOLIC BLOOD PRESSURE: 113 MMHG | WEIGHT: 213 LBS | BODY MASS INDEX: 38.96 KG/M2 | SYSTOLIC BLOOD PRESSURE: 161 MMHG

## 2021-12-20 VITALS — DIASTOLIC BLOOD PRESSURE: 87 MMHG | SYSTOLIC BLOOD PRESSURE: 137 MMHG | HEART RATE: 81 BPM

## 2021-12-20 DIAGNOSIS — Z34.80 SUPERVISION OF OTHER NORMAL PREGNANCY, ANTEPARTUM: Primary | ICD-10-CM

## 2021-12-20 DIAGNOSIS — O10.919 CHRONIC HYPERTENSION AFFECTING PREGNANCY: ICD-10-CM

## 2021-12-20 DIAGNOSIS — K21.9 GASTROESOPHAGEAL REFLUX DISEASE WITHOUT ESOPHAGITIS: ICD-10-CM

## 2021-12-20 LAB
ALBUMIN SERPL-MCNC: 3.1 G/DL (ref 3.5–5.2)
ALBUMIN/GLOB SERPL: 0.9 G/DL
ALP SERPL-CCNC: 112 U/L (ref 39–117)
ALT SERPL W P-5'-P-CCNC: 19 U/L (ref 1–33)
ANION GAP SERPL CALCULATED.3IONS-SCNC: 12.8 MMOL/L (ref 5–15)
AST SERPL-CCNC: 27 U/L (ref 1–32)
BILIRUB SERPL-MCNC: <0.2 MG/DL (ref 0–1.2)
BUN SERPL-MCNC: 9 MG/DL (ref 6–20)
BUN/CREAT SERPL: 12.2 (ref 7–25)
CALCIUM SPEC-SCNC: 9 MG/DL (ref 8.6–10.5)
CHLORIDE SERPL-SCNC: 101 MMOL/L (ref 98–107)
CO2 SERPL-SCNC: 20.2 MMOL/L (ref 22–29)
CREAT SERPL-MCNC: 0.74 MG/DL (ref 0.57–1)
CREAT UR-MCNC: 223.2 MG/DL
DEPRECATED RDW RBC AUTO: 45.4 FL (ref 37–54)
ERYTHROCYTE [DISTWIDTH] IN BLOOD BY AUTOMATED COUNT: 15.9 % (ref 12.3–15.4)
GFR SERPL CREATININE-BSD FRML MDRD: 93 ML/MIN/1.73
GLOBULIN UR ELPH-MCNC: 3.4 GM/DL
GLUCOSE SERPL-MCNC: 87 MG/DL (ref 65–99)
GLUCOSE UR STRIP-MCNC: NEGATIVE MG/DL
HCT VFR BLD AUTO: 33.7 % (ref 34–46.6)
HGB BLD-MCNC: 11.1 G/DL (ref 12–15.9)
LDH SERPL-CCNC: 286 U/L (ref 135–214)
MCH RBC QN AUTO: 26.4 PG (ref 26.6–33)
MCHC RBC AUTO-ENTMCNC: 32.9 G/DL (ref 31.5–35.7)
MCV RBC AUTO: 80.2 FL (ref 79–97)
PLATELET # BLD AUTO: 312 10*3/MM3 (ref 140–450)
PMV BLD AUTO: 11.5 FL (ref 6–12)
POTASSIUM SERPL-SCNC: 4.1 MMOL/L (ref 3.5–5.2)
PROT ?TM UR-MCNC: 43.4 MG/DL
PROT SERPL-MCNC: 6.5 G/DL (ref 6–8.5)
PROT UR STRIP-MCNC: ABNORMAL MG/DL
PROT/CREAT UR: 0.2 MG/G{CREAT}
RBC # BLD AUTO: 4.2 10*6/MM3 (ref 3.77–5.28)
SODIUM SERPL-SCNC: 134 MMOL/L (ref 136–145)
WBC NRBC COR # BLD: 14.7 10*3/MM3 (ref 3.4–10.8)

## 2021-12-20 PROCEDURE — 59025 FETAL NON-STRESS TEST: CPT

## 2021-12-20 PROCEDURE — 85027 COMPLETE CBC AUTOMATED: CPT | Performed by: OBSTETRICS & GYNECOLOGY

## 2021-12-20 PROCEDURE — 80053 COMPREHEN METABOLIC PANEL: CPT | Performed by: OBSTETRICS & GYNECOLOGY

## 2021-12-20 PROCEDURE — 0502F SUBSEQUENT PRENATAL CARE: CPT | Performed by: OBSTETRICS & GYNECOLOGY

## 2021-12-20 PROCEDURE — 82570 ASSAY OF URINE CREATININE: CPT | Performed by: OBSTETRICS & GYNECOLOGY

## 2021-12-20 PROCEDURE — 83615 LACTATE (LD) (LDH) ENZYME: CPT | Performed by: OBSTETRICS & GYNECOLOGY

## 2021-12-20 PROCEDURE — G0463 HOSPITAL OUTPT CLINIC VISIT: HCPCS

## 2021-12-20 PROCEDURE — 84156 ASSAY OF PROTEIN URINE: CPT | Performed by: OBSTETRICS & GYNECOLOGY

## 2021-12-20 NOTE — TELEPHONE ENCOUNTER
VO per Dr. Michaels: Patient advised to take 50 mg twice a day. She is to go to L&D if her BP is above 160/110. Patient was advised of Dr. Michaels's recommendations and understands instructions.

## 2021-12-20 NOTE — NURSING NOTE
Dr michael reviewed tracing, labs, and blood pressures. Dr michael ordered to monitor for one more hour, if tracing is reactive and ressuring patient can be discharged with 24 hour urine collection.

## 2021-12-20 NOTE — SIGNIFICANT NOTE
12/20/21 1245   Nonstress Test   Reason for NST Other (Comment)  (nst and labs, sent from office)   Acoustic Stimulator No   Uterine Irritability No   Contractions Not present   Fetal Assessment   Fetal Movement active   Fetal HR Assessment Method external   Fetal HR (beats/min) 135   Fetal Heart Baseline Rate normal range   Fetal HR Variability moderate (amplitude range 6 to 25 bpm)   Fetal HR Accelerations greater than/equal to 15 bpm; lasting at least 15 seconds   Fetal HR Decelerations absent   Interpretation A   Nonstress Test Interpretation A Reactive     Patient Vitals for the past 24 hrs:   BP Pulse   12/20/21 1131 137/87 81   12/20/21 1116 125/76 73   12/20/21 1106 134/80 78      Reason for test: (P) Other (Comment) (nst and labs, sent from office)  Date of Test: 12/20/2021  Time frame of test: 4803-6290  RN NST Interpretation: (P) Reactive    33w2d

## 2021-12-20 NOTE — TELEPHONE ENCOUNTER
1. Patient called stating she is getting discharged from L&D now. She states you had mentioned changing the dosage on her Labetalol but she couldn't remember what you said. Please advise how/what dosage she should be taking.  2. You had mentioned monitoring her BP at home before she follows up on 12/22. What cut off should she watch for on her BP. What does she need to do if either and/or both of the numbers are elevated. Call the office, go to L&D? Please advise.

## 2021-12-20 NOTE — PROGRESS NOTES
OB FOLLOW UP    CC: Scheduled OB routine FU     Prenatal care complicated by:   Patient Active Problem List   Diagnosis   • Accelerated hypertension   • Acute depression   • Anxiety   • GERD (gastroesophageal reflux disease)   • Essential hypertension   • Family history of fatty liver   • Hiatal hernia   • Overactive bladder   • Interstitial cystitis   • Kidney stones   • Microhematuria   • Chronic hypertension affecting pregnancy   • Supervision of other normal pregnancy, antepartum   • Obesity (BMI 30-39.9)   • PUPP (pruritic urticarial papules and plaques of pregnancy)       Subjective:   Patient has: No complaints, No leaking fluid, No vaginal bleeding, No contractions, Adequate FM    Objective:  Urine glucose/protein- see flow sheet      BP (!) 161/113   Wt 96.6 kg (213 lb)   LMP 04/11/2021 (Exact Date)   BMI 38.96 kg/m²   See OB flow for LE edema, and cvx exam if applicable  FHT: 152 BPM   Uterine Size: 33 cm      Assessment and Plan:  Diagnoses and all orders for this visit:    1. Supervision of other normal pregnancy, antepartum (Primary)  -     POC Urinalysis Dipstick    2. Chronic hypertension affecting pregnancy  Overview:  Twice weekly NSTs  Labetalol 100 mg p.o. twice daily    Assessment & Plan:  The patient nasal middle office milligrams daily.  Her blood pressure is significantly elevated today.  To labor and delivery for an NST and laboratory evaluation.  If released plan to see her back on Wednesday with a 24 urine collection.  Plan to also increase patient's labetalol 200 mg twice daily.  Discussed and reviewed preeclampsia warnings.  Strict fetal kick counts.  Patient will need to start twice weekly NSTs.    Orders:  -     Fetal Nonstress Test; Standing  -     Protein, Urine, 24 Hour - Urine, Clean Catch; Future    3. Gastroesophageal reflux disease without esophagitis  Assessment & Plan:  Continue omeprazole.  Symptoms stable.          33w2d  Reassuring pregnancy progress    Counseling: OB  precautions, leaking, VB, alexis guevara vs PTL/Labor  FKC    Questions answered    Return in about 2 days (around 12/22/2021) for Recheck.      Fabien Michaels MD  12/20/2021

## 2021-12-21 ENCOUNTER — HOSPITAL ENCOUNTER (OUTPATIENT)
Facility: HOSPITAL | Age: 31
Discharge: HOME | End: 2021-12-22
Attending: STUDENT IN AN ORGANIZED HEALTH CARE EDUCATION/TRAINING PROGRAM | Admitting: OBSTETRICS & GYNECOLOGY

## 2021-12-21 ENCOUNTER — LAB (OUTPATIENT)
Dept: LAB | Facility: HOSPITAL | Age: 31
End: 2021-12-21

## 2021-12-21 DIAGNOSIS — O10.919 CHRONIC HYPERTENSION AFFECTING PREGNANCY: ICD-10-CM

## 2021-12-21 LAB
ALBUMIN SERPL-MCNC: 3.1 G/DL (ref 3.5–5.2)
ALBUMIN/GLOB SERPL: 1 G/DL
ALP SERPL-CCNC: 140 U/L (ref 39–117)
ALT SERPL W P-5'-P-CCNC: 11 U/L (ref 1–33)
ANION GAP SERPL CALCULATED.3IONS-SCNC: 10.9 MMOL/L (ref 5–15)
AST SERPL-CCNC: 25 U/L (ref 1–32)
BILIRUB SERPL-MCNC: <0.2 MG/DL (ref 0–1.2)
BUN SERPL-MCNC: 8 MG/DL (ref 6–20)
BUN/CREAT SERPL: 10.4 (ref 7–25)
CALCIUM SPEC-SCNC: 8.9 MG/DL (ref 8.6–10.5)
CHLORIDE SERPL-SCNC: 105 MMOL/L (ref 98–107)
CO2 SERPL-SCNC: 21.1 MMOL/L (ref 22–29)
COLLECT DURATION TIME UR: 24 HRS
CREAT SERPL-MCNC: 0.77 MG/DL (ref 0.57–1)
DEPRECATED RDW RBC AUTO: 47.5 FL (ref 37–54)
ERYTHROCYTE [DISTWIDTH] IN BLOOD BY AUTOMATED COUNT: 16 % (ref 12.3–15.4)
GFR SERPL CREATININE-BSD FRML MDRD: 89 ML/MIN/1.73
GLOBULIN UR ELPH-MCNC: 3.1 GM/DL
GLUCOSE SERPL-MCNC: 110 MG/DL (ref 65–99)
HCT VFR BLD AUTO: 32.1 % (ref 34–46.6)
HGB BLD-MCNC: 10.4 G/DL (ref 12–15.9)
MCH RBC QN AUTO: 26.7 PG (ref 26.6–33)
MCHC RBC AUTO-ENTMCNC: 32.4 G/DL (ref 31.5–35.7)
MCV RBC AUTO: 82.3 FL (ref 79–97)
PLATELET # BLD AUTO: 291 10*3/MM3 (ref 140–450)
PMV BLD AUTO: 11.7 FL (ref 6–12)
POTASSIUM SERPL-SCNC: 3.8 MMOL/L (ref 3.5–5.2)
PROT 24H UR-MRATE: 195.5 MG/24HOURS (ref 0–150)
PROT SERPL-MCNC: 6.2 G/DL (ref 6–8.5)
RBC # BLD AUTO: 3.9 10*6/MM3 (ref 3.77–5.28)
SODIUM SERPL-SCNC: 137 MMOL/L (ref 136–145)
SPECIMEN VOL 24H UR: 1150 ML
WBC NRBC COR # BLD: 13.38 10*3/MM3 (ref 3.4–10.8)

## 2021-12-21 PROCEDURE — 84156 ASSAY OF PROTEIN URINE: CPT

## 2021-12-21 PROCEDURE — 85027 COMPLETE CBC AUTOMATED: CPT | Performed by: OBSTETRICS & GYNECOLOGY

## 2021-12-21 PROCEDURE — 80053 COMPREHEN METABOLIC PANEL: CPT | Performed by: OBSTETRICS & GYNECOLOGY

## 2021-12-21 PROCEDURE — 81050 URINALYSIS VOLUME MEASURE: CPT

## 2021-12-21 PROCEDURE — G0463 HOSPITAL OUTPT CLINIC VISIT: HCPCS

## 2021-12-21 NOTE — ASSESSMENT & PLAN NOTE
The patient nasal middle office milligrams daily.  Her blood pressure is significantly elevated today.  To labor and delivery for an NST and laboratory evaluation.  If released plan to see her back on Wednesday with a 24 urine collection.  Plan to also increase patient's labetalol 200 mg twice daily.  Discussed and reviewed preeclampsia warnings.  Strict fetal kick counts.  Patient will need to start twice weekly NSTs.

## 2021-12-22 ENCOUNTER — ROUTINE PRENATAL (OUTPATIENT)
Dept: OBSTETRICS AND GYNECOLOGY | Facility: CLINIC | Age: 31
End: 2021-12-22

## 2021-12-22 ENCOUNTER — HOSPITAL ENCOUNTER (OUTPATIENT)
Facility: HOSPITAL | Age: 31
Discharge: HOME | End: 2021-12-23
Attending: OBSTETRICS & GYNECOLOGY | Admitting: OBSTETRICS & GYNECOLOGY

## 2021-12-22 VITALS
OXYGEN SATURATION: 99 % | HEART RATE: 70 BPM | SYSTOLIC BLOOD PRESSURE: 130 MMHG | DIASTOLIC BLOOD PRESSURE: 83 MMHG | BODY MASS INDEX: 39.2 KG/M2 | RESPIRATION RATE: 18 BRPM | TEMPERATURE: 98.3 F | HEIGHT: 62 IN | WEIGHT: 213 LBS

## 2021-12-22 VITALS — SYSTOLIC BLOOD PRESSURE: 137 MMHG | DIASTOLIC BLOOD PRESSURE: 88 MMHG | WEIGHT: 211.8 LBS | BODY MASS INDEX: 38.74 KG/M2

## 2021-12-22 DIAGNOSIS — Z34.80 SUPERVISION OF OTHER NORMAL PREGNANCY, ANTEPARTUM: Primary | ICD-10-CM

## 2021-12-22 DIAGNOSIS — O10.919 CHRONIC HYPERTENSION AFFECTING PREGNANCY: ICD-10-CM

## 2021-12-22 LAB
DEPRECATED RDW RBC AUTO: 48 FL (ref 37–54)
ERYTHROCYTE [DISTWIDTH] IN BLOOD BY AUTOMATED COUNT: 16.1 % (ref 12.3–15.4)
GLUCOSE UR STRIP-MCNC: NEGATIVE MG/DL
HCT VFR BLD AUTO: 33.7 % (ref 34–46.6)
HGB BLD-MCNC: 11 G/DL (ref 12–15.9)
MCH RBC QN AUTO: 26.9 PG (ref 26.6–33)
MCHC RBC AUTO-ENTMCNC: 32.6 G/DL (ref 31.5–35.7)
MCV RBC AUTO: 82.4 FL (ref 79–97)
PLATELET # BLD AUTO: 286 10*3/MM3 (ref 140–450)
PMV BLD AUTO: 11.9 FL (ref 6–12)
PROT UR STRIP-MCNC: ABNORMAL MG/DL
RBC # BLD AUTO: 4.09 10*6/MM3 (ref 3.77–5.28)
WBC NRBC COR # BLD: 13.67 10*3/MM3 (ref 3.4–10.8)

## 2021-12-22 PROCEDURE — 85027 COMPLETE CBC AUTOMATED: CPT | Performed by: OBSTETRICS & GYNECOLOGY

## 2021-12-22 PROCEDURE — 82570 ASSAY OF URINE CREATININE: CPT | Performed by: OBSTETRICS & GYNECOLOGY

## 2021-12-22 PROCEDURE — 0502F SUBSEQUENT PRENATAL CARE: CPT | Performed by: OBSTETRICS & GYNECOLOGY

## 2021-12-22 PROCEDURE — 84156 ASSAY OF PROTEIN URINE: CPT | Performed by: OBSTETRICS & GYNECOLOGY

## 2021-12-22 PROCEDURE — 59025 FETAL NON-STRESS TEST: CPT | Performed by: OBSTETRICS & GYNECOLOGY

## 2021-12-22 PROCEDURE — 59025 FETAL NON-STRESS TEST: CPT

## 2021-12-22 PROCEDURE — 80053 COMPREHEN METABOLIC PANEL: CPT | Performed by: OBSTETRICS & GYNECOLOGY

## 2021-12-22 RX ORDER — BETAMETHASONE SODIUM PHOSPHATE AND BETAMETHASONE ACETATE 3; 3 MG/ML; MG/ML
12 INJECTION, SUSPENSION INTRA-ARTICULAR; INTRALESIONAL; INTRAMUSCULAR; SOFT TISSUE EVERY 24 HOURS
Status: DISCONTINUED | OUTPATIENT
Start: 2021-12-23 | End: 2021-12-23 | Stop reason: HOSPADM

## 2021-12-22 RX ORDER — ACETAMINOPHEN 325 MG/1
650 TABLET ORAL ONCE
Status: DISCONTINUED | OUTPATIENT
Start: 2021-12-23 | End: 2021-12-22

## 2021-12-22 RX ORDER — ACETAMINOPHEN 500 MG
1000 TABLET ORAL ONCE
Status: COMPLETED | OUTPATIENT
Start: 2021-12-23 | End: 2021-12-23

## 2021-12-22 NOTE — NURSING NOTE
Arrived to triage with c.o elevated blood pressures at home. Pt chtn who is taking labetolol during pregnancy.  @ 33.3 gestation. Was moving a cough before checking pressures. No pain, headache or dizziness. Trace edema to BLE that pt reports is no worse than it normally is. Reassuring FHR. Dr. Henderson called and notified of arrival and assessments. VS discussed and results of pt 24H urine that was done this AM. Ordered CBC and comp.

## 2021-12-22 NOTE — SIGNIFICANT NOTE
12/22/21 0039   Nonstress Test   Reason for NST OB Triage; Hypertension; Other (Comment)  (chronic hypertension)   Acoustic Stimulator No   Uterine Irritability No   Contractions Not present   Fetal Assessment   Fetal Movement active   Fetal HR Assessment Method external   Fetal HR (beats/min) 130  (130's)   Fetal Heart Baseline Rate normal range   Fetal HR Variability moderate (amplitude range 6 to 25 bpm)   Fetal HR Accelerations episodic; greater than/equal to 15 bpm   Fetal HR Decelerations absent   Fetal HR Tracing Category Category I   Sinusoidal Pattern Present absent   Interpretation A   Nonstress Test Interpretation A Reactive

## 2021-12-22 NOTE — NURSING NOTE
Results from labs resulted. BPS WNL. Reassuring FHR. Educated on resting at home and proper ways to take Bps. Told to keep follow up appointment in office tomorrow. Written discharge packet given to patient. Left ambulatory with significant other.

## 2021-12-22 NOTE — SIGNIFICANT NOTE
"33w4d  /83   Pulse 70   Temp 98.3 °F (36.8 °C) (Oral)   Resp 18   Ht 157.5 cm (62\")   Wt 96.6 kg (213 lb)   LMP 04/11/2021 (Exact Date)   SpO2 99%   BMI 38.96 kg/m²   C/o hypertension at home  Duration: 1221-83680000 12/22/21 0005   Nonstress Test   Reason for NST OB Triage; Hypertension   Acoustic Stimulator No   Uterine Irritability No   Contractions Not present   Fetal Assessment   Fetal Movement active   Fetal HR Assessment Method external   Fetal HR (beats/min) 135   Fetal Heart Baseline Rate normal range   Fetal HR Variability moderate (amplitude range 6 to 25 bpm)   Fetal HR Accelerations greater than/equal to 15 bpm; lasting at least 15 seconds   Fetal HR Decelerations absent   Sinusoidal Pattern Present absent   Interpretation A   Nonstress Test Interpretation A Reactive     "

## 2021-12-23 ENCOUNTER — HOSPITAL ENCOUNTER (OUTPATIENT)
Facility: HOSPITAL | Age: 31
End: 2021-12-23
Attending: OBSTETRICS & GYNECOLOGY | Admitting: OBSTETRICS & GYNECOLOGY

## 2021-12-23 VITALS
HEART RATE: 90 BPM | DIASTOLIC BLOOD PRESSURE: 84 MMHG | RESPIRATION RATE: 17 BRPM | SYSTOLIC BLOOD PRESSURE: 125 MMHG | TEMPERATURE: 98.1 F | OXYGEN SATURATION: 98 %

## 2021-12-23 LAB
ALBUMIN SERPL-MCNC: 3.2 G/DL (ref 3.5–5.2)
ALBUMIN/GLOB SERPL: 1 G/DL
ALP SERPL-CCNC: 114 U/L (ref 39–117)
ALT SERPL W P-5'-P-CCNC: 17 U/L (ref 1–33)
ANION GAP SERPL CALCULATED.3IONS-SCNC: 11.1 MMOL/L (ref 5–15)
AST SERPL-CCNC: 22 U/L (ref 1–32)
BILIRUB SERPL-MCNC: <0.2 MG/DL (ref 0–1.2)
BUN SERPL-MCNC: 10 MG/DL (ref 6–20)
BUN/CREAT SERPL: 11.6 (ref 7–25)
CALCIUM SPEC-SCNC: 9.3 MG/DL (ref 8.6–10.5)
CHLORIDE SERPL-SCNC: 104 MMOL/L (ref 98–107)
CO2 SERPL-SCNC: 21.9 MMOL/L (ref 22–29)
CREAT SERPL-MCNC: 0.86 MG/DL (ref 0.57–1)
CREAT UR-MCNC: 91.6 MG/DL
GFR SERPL CREATININE-BSD FRML MDRD: 78 ML/MIN/1.73
GLOBULIN UR ELPH-MCNC: 3.1 GM/DL
GLUCOSE SERPL-MCNC: 93 MG/DL (ref 65–99)
POTASSIUM SERPL-SCNC: 4.3 MMOL/L (ref 3.5–5.2)
PROT ?TM UR-MCNC: 36.7 MG/DL
PROT SERPL-MCNC: 6.3 G/DL (ref 6–8.5)
PROT/CREAT UR: 0.4 MG/G{CREAT}
SODIUM SERPL-SCNC: 137 MMOL/L (ref 136–145)

## 2021-12-23 PROCEDURE — 96372 THER/PROPH/DIAG INJ SC/IM: CPT

## 2021-12-23 PROCEDURE — G0463 HOSPITAL OUTPT CLINIC VISIT: HCPCS

## 2021-12-23 PROCEDURE — 87081 CULTURE SCREEN ONLY: CPT | Performed by: OBSTETRICS & GYNECOLOGY

## 2021-12-23 PROCEDURE — 59025 FETAL NON-STRESS TEST: CPT

## 2021-12-23 PROCEDURE — 25010000002 BETAMETHASONE ACET & SOD PHOS PER 4 MG: Performed by: OBSTETRICS & GYNECOLOGY

## 2021-12-23 PROCEDURE — 51702 INSERT TEMP BLADDER CATH: CPT

## 2021-12-23 RX ORDER — LIDOCAINE HYDROCHLORIDE 10 MG/ML
5 INJECTION, SOLUTION EPIDURAL; INFILTRATION; INTRACAUDAL; PERINEURAL AS NEEDED
Status: DISCONTINUED | OUTPATIENT
Start: 2021-12-23 | End: 2021-12-23 | Stop reason: HOSPADM

## 2021-12-23 RX ORDER — ACETAMINOPHEN 325 MG/1
650 TABLET ORAL EVERY 4 HOURS PRN
Status: DISCONTINUED | OUTPATIENT
Start: 2021-12-23 | End: 2021-12-23 | Stop reason: HOSPADM

## 2021-12-23 RX ORDER — PROMETHAZINE HYDROCHLORIDE 12.5 MG/1
12.5 SUPPOSITORY RECTAL EVERY 6 HOURS PRN
Status: DISCONTINUED | OUTPATIENT
Start: 2021-12-23 | End: 2021-12-23 | Stop reason: HOSPADM

## 2021-12-23 RX ORDER — CALCIUM GLUCONATE 94 MG/ML
1 INJECTION, SOLUTION INTRAVENOUS ONCE AS NEEDED
Status: DISCONTINUED | OUTPATIENT
Start: 2021-12-23 | End: 2021-12-23 | Stop reason: HOSPADM

## 2021-12-23 RX ORDER — NIFEDIPINE 10 MG/1
10-20 CAPSULE ORAL
Status: DISCONTINUED | OUTPATIENT
Start: 2021-12-23 | End: 2021-12-23 | Stop reason: HOSPADM

## 2021-12-23 RX ORDER — SODIUM CHLORIDE, SODIUM LACTATE, POTASSIUM CHLORIDE, CALCIUM CHLORIDE 600; 310; 30; 20 MG/100ML; MG/100ML; MG/100ML; MG/100ML
125 INJECTION, SOLUTION INTRAVENOUS CONTINUOUS
Status: DISCONTINUED | OUTPATIENT
Start: 2021-12-23 | End: 2021-12-23 | Stop reason: HOSPADM

## 2021-12-23 RX ORDER — SODIUM CHLORIDE 0.9 % (FLUSH) 0.9 %
3 SYRINGE (ML) INJECTION EVERY 12 HOURS SCHEDULED
Status: DISCONTINUED | OUTPATIENT
Start: 2021-12-23 | End: 2021-12-23 | Stop reason: HOSPADM

## 2021-12-23 RX ORDER — SODIUM CHLORIDE 0.9 % (FLUSH) 0.9 %
10 SYRINGE (ML) INJECTION AS NEEDED
Status: DISCONTINUED | OUTPATIENT
Start: 2021-12-23 | End: 2021-12-23 | Stop reason: HOSPADM

## 2021-12-23 RX ORDER — ONDANSETRON 4 MG/1
4 TABLET, FILM COATED ORAL EVERY 6 HOURS PRN
Status: DISCONTINUED | OUTPATIENT
Start: 2021-12-23 | End: 2021-12-23 | Stop reason: HOSPADM

## 2021-12-23 RX ORDER — HYDRALAZINE HYDROCHLORIDE 20 MG/ML
5-10 INJECTION INTRAMUSCULAR; INTRAVENOUS
Status: DISCONTINUED | OUTPATIENT
Start: 2021-12-23 | End: 2021-12-23 | Stop reason: HOSPADM

## 2021-12-23 RX ORDER — ONDANSETRON 2 MG/ML
4 INJECTION INTRAMUSCULAR; INTRAVENOUS EVERY 6 HOURS PRN
Status: DISCONTINUED | OUTPATIENT
Start: 2021-12-23 | End: 2021-12-23 | Stop reason: HOSPADM

## 2021-12-23 RX ORDER — LABETALOL HYDROCHLORIDE 5 MG/ML
20-80 INJECTION, SOLUTION INTRAVENOUS
Status: DISCONTINUED | OUTPATIENT
Start: 2021-12-23 | End: 2021-12-23 | Stop reason: HOSPADM

## 2021-12-23 RX ORDER — PROMETHAZINE HYDROCHLORIDE 12.5 MG/1
12.5 TABLET ORAL EVERY 6 HOURS PRN
Status: DISCONTINUED | OUTPATIENT
Start: 2021-12-23 | End: 2021-12-23 | Stop reason: HOSPADM

## 2021-12-23 RX ORDER — MAGNESIUM SULFATE HEPTAHYDRATE 40 MG/ML
2 INJECTION, SOLUTION INTRAVENOUS CONTINUOUS
Status: DISCONTINUED | OUTPATIENT
Start: 2021-12-23 | End: 2021-12-23 | Stop reason: HOSPADM

## 2021-12-23 RX ADMIN — ACETAMINOPHEN 1000 MG: 500 TABLET, FILM COATED ORAL at 00:20

## 2021-12-23 RX ADMIN — BETAMETHASONE ACETATE AND BETAMETHASONE SODIUM PHOSPHATE 12 MG: 3; 3 INJECTION, SUSPENSION INTRA-ARTICULAR; INTRALESIONAL; INTRAMUSCULAR; SOFT TISSUE at 00:20

## 2021-12-23 RX ADMIN — SODIUM CHLORIDE, POTASSIUM CHLORIDE, SODIUM LACTATE AND CALCIUM CHLORIDE 125 ML/HR: 600; 310; 30; 20 INJECTION, SOLUTION INTRAVENOUS at 01:02

## 2021-12-23 NOTE — NURSING NOTE
From 3441-0360 Pt was sitting up for serial BPS per MD orders and EFM was tracing maternal pulse during this time.

## 2021-12-23 NOTE — NURSING NOTE
Dr. Smyth notified of arrival to triage.  @ 33.5 gestation. Pt takes labetelol during pregnancy for HTN, was increased to 100mg BID today in office and today patient has taken 150mg total. 24H urine was resulted yesterday and was 195.5. Pt reported headache to provider that's been ongoing through the day. SVE  by MD. Order for CBC, CMP, PC RATIO, GBS swab, Betamethasone placed.

## 2021-12-23 NOTE — SIGNIFICANT NOTE
33w5d  /84 (BP Location: Right arm, Patient Position: Sitting)   Pulse 90   Temp 98.1 °F (36.7 °C) (Oral)   Resp 17   LMP 04/11/2021 (Exact Date)   SpO2 98%   Duration: 2330-0510 12/23/21 0540   Nonstress Test   Reason for NST OB Triage; Hypertension   Acoustic Stimulator No   Uterine Irritability No   Contractions Not present   Fetal Assessment   Fetal Movement active   Fetal HR Assessment Method external   Fetal HR (beats/min) 140   Fetal Heart Baseline Rate normal range   Fetal HR Variability moderate (amplitude range 6 to 25 bpm)   Fetal HR Accelerations episodic; greater than/equal to 15 bpm   Fetal HR Decelerations absent   Sinusoidal Pattern Present absent   Interpretation A   Nonstress Test Interpretation A Reactive

## 2021-12-23 NOTE — H&P
OB HISTORY AND PHYSICAL      SUBJECTIVE:    31 y.o. female  currently at 33w5d [facility] complicated by:  CHTN w superimposed pre-e, PUPPs, obesity in preg    CC/HPI:  Presents with Elevated BP. Pt w known CHTN on labetalol initially 50mg BID, just increased to 100mg BID yesterday in office.      Pt felt HR was elevated and had HA.  BP at home 170-180/100's.      ROS: No leaking fluid, No vaginal bleeding, No contractions, Adequate FM, No scotomata or vision changes, No RUQ/epigastric pain, Swelling some in face and Diarrhea (loose stools, TID over last month).    Past OB History:   OB History    Para Term  AB Living   1             SAB IAB Ectopic Molar Multiple Live Births                    # Outcome Date GA Lbr Cesar/2nd Weight Sex Delivery Anes PTL Lv   1 Current                 Prenatal Labs:  Reviewed, see Epic    PMHx:    Past Medical History:   Diagnosis Date   • Anxiety    • Depression     acute   • Esophageal ulcer without bleeding    • Essential hypertension    • Gastric ulcer    • GERD (gastroesophageal reflux disease)    • Hiatal hernia 2018   • Interstitial cystitis 2017   • Kidney stones    • Microhematuria    • Overactive bladder 2017       Home Medications:  Prenatal Adult Gummy/DHA/FA, escitalopram, ferrous sulfate, labetalol, and omeprazole    Allergies:  Allergies   Allergen Reactions   • Amoxicillin Hives   • Sulfate Swelling       PSHx:    Past Surgical History:   Procedure Laterality Date   • COLONOSCOPY  2017   • ENDOSCOPY  2017   • WISDOM TOOTH EXTRACTION         Social History:    reports that she has never smoked. She has never used smokeless tobacco. She reports previous alcohol use. She reports that she does not use drugs.    Family History: Non contributory    Immunizations: See prenatal record for Tdap, Flu, Covid and/or other vaccinations    PHYSICAL EXAM:  Heart Rate:  [73] 73  BP: (129-166)/() 152/95, 166/104  General- NAD, alert and  oriented, appropriate  Psych- normal mood, good memory  CV- Regular rhythm, no murnurs  Resp- CTA to bases, no wheezes  Abdomen- Gravid, non tender  Fundus-  Non tender.  Size: consistent with dates  Cvx- 1cm / 50% / OOP/Ballotable  Presentation- VTX  Ext/DTR: +1 edema, bilaterally equal, no signs of DVT, DTR patella +3, DTR achilles clonus +1    Fetal HR: Category I  Contractions: Not alberto     Lab/Imaging/Other:   CBC    CBC 12/20/21 12/21/21 12/22/21   WBC 14.70 (A) 13.38 (A) 13.67 (A)   RBC 4.20 3.90 4.09   Hemoglobin 11.1 (A) 10.4 (A) 11.0 (A)   Hematocrit 33.7 (A) 32.1 (A) 33.7 (A)   MCV 80.2 82.3 82.4   MCH 26.4 (A) 26.7 26.9   MCHC 32.9 32.4 32.6   RDW 15.9 (A) 16.0 (A) 16.1 (A)   Platelets 312 291 286   (A) Abnormal value            CMP    CMP 12/20/21 12/21/21 12/22/21   Glucose 87 110 (A) 93   BUN 9 8 10   Creatinine 0.74 0.77 0.86   eGFR Non African Am 93 89 78   Sodium 134 (A) 137 137   Potassium 4.1 3.8 4.3   Chloride 101 105 104   Calcium 9.0 8.9 9.3   Albumin 3.10 (A) 3.10 (A) 3.20 (A)   Total Bilirubin <0.2 <0.2 <0.2   Alkaline Phosphatase 112 140 (A) 114   AST (SGOT) 27 25 22   ALT (SGPT) 19 11 17   (A) Abnormal value            UPC 0.4 (24hr Upro on 21Dec 195mg)    ASSESSMENT:  33w5d  CHTN w superimposed pre-e w severe features  GBS: Unknown    PLAN:  Rec transfer MFM, Pt desires [facility]  Mag  BMTZ  GBS  Dr. Caroline Lainez accepting, L+D triage [facility]  I have dw pt and         Electronically signed by Diana Smyth, , 12/23/21, 12:22 AM EST.    [facility]  [address] [place] [address]  Dept: [phone]  Loc: [phone]

## 2021-12-23 NOTE — NURSING NOTE
Pt arrived ambulatory to triage with c/o elevated blood pressure of 180/100 at home. Urine specimen collected. Placed on monitor. Initial BP was 171/94. At time of assessment pt denied any pain or headaches. Trace edema noted to BLE. Reassuring FHR, no ctx noted by palpation or toco.

## 2021-12-25 LAB — BACTERIA SPEC AEROBE CULT: NORMAL

## 2021-12-27 NOTE — ASSESSMENT & PLAN NOTE
Patient blood pressure is in the normal range on her labetalol 100 mg twice daily.  24-hour urine is normal  Laboratory assessment was reassuring  Continue labetalol 100 mg twice daily.  Continue NSTs  Preeclampsia warnings

## 2021-12-27 NOTE — PROGRESS NOTES
OB FOLLOW UP    CC: Scheduled OB routine FU     Prenatal care complicated by:   Patient Active Problem List   Diagnosis   • Accelerated hypertension   • Acute depression   • Anxiety   • GERD (gastroesophageal reflux disease)   • Essential hypertension   • Family history of fatty liver   • Hiatal hernia   • Overactive bladder   • Interstitial cystitis   • Kidney stones   • Microhematuria   • Chronic hypertension affecting pregnancy   • Supervision of other normal pregnancy, antepartum   • Obesity (BMI 30-39.9)   • PUPP (pruritic urticarial papules and plaques of pregnancy)       Subjective:   Patient has: No complaints, No leaking fluid, No vaginal bleeding, No contractions, Adequate FM, No HA, No scotomata or vision changes, No RUQ/epigastric pain    Objective:  Urine glucose/protein- see flow sheet      /88   Wt 96.1 kg (211 lb 12.8 oz)   LMP 2021 (Exact Date)   BMI 38.74 kg/m²   See OB flow for LE edema, and cvx exam if applicable  FHT: 142 BPM   Uterine Size: 33 cm      Assessment and Plan:  Diagnoses and all orders for this visit:    1. Supervision of other normal pregnancy, antepartum (Primary)  Assessment & Plan:  Continue prenatal vitamins  Fetal kick counts   labor warnings    Orders:  -     POC Urinalysis Dipstick    2. Chronic hypertension affecting pregnancy  Overview:  Twice weekly NSTs  Labetalol 100 mg p.o. twice daily    Assessment & Plan:  Patient blood pressure is in the normal range on her labetalol 100 mg twice daily.  24-hour urine is normal  Laboratory assessment was reassuring  Continue labetalol 100 mg twice daily.  Continue NSTs  Preeclampsia warnings    Orders:  -     US Ob 14 + Weeks Single or First Gestation; Future      33w4d  Reassuring pregnancy progress    Counseling: OB precautions, leaking, VB, alexis guevara vs PTL/Labor  FKC  HTN precautions, HA, vision change, RUQ/epigastric pain, edema    Questions answered    Return in about 1 week (around 2021) for  Josiane.      Fabien Michaels MD  12/22/2021

## 2022-01-06 ENCOUNTER — OFFICE VISIT (OUTPATIENT)
Dept: FAMILY MEDICINE CLINIC | Facility: CLINIC | Age: 32
End: 2022-01-06

## 2022-01-06 VITALS
HEART RATE: 98 BPM | SYSTOLIC BLOOD PRESSURE: 122 MMHG | HEIGHT: 62 IN | TEMPERATURE: 98 F | WEIGHT: 190 LBS | BODY MASS INDEX: 34.96 KG/M2 | RESPIRATION RATE: 16 BRPM | DIASTOLIC BLOOD PRESSURE: 86 MMHG | OXYGEN SATURATION: 98 %

## 2022-01-06 DIAGNOSIS — F41.9 ANXIETY: ICD-10-CM

## 2022-01-06 PROCEDURE — 99213 OFFICE O/P EST LOW 20 MIN: CPT | Performed by: NURSE PRACTITIONER

## 2022-01-06 RX ORDER — ESCITALOPRAM OXALATE 20 MG/1
20 TABLET ORAL DAILY
Qty: 90 TABLET | Refills: 1 | Status: SHIPPED | OUTPATIENT
Start: 2022-01-06 | End: 2022-07-13 | Stop reason: SDUPTHER

## 2022-01-06 RX ORDER — NIFEDIPINE 60 MG/1
60 TABLET, EXTENDED RELEASE ORAL EVERY MORNING
COMMUNITY
End: 2022-02-10

## 2022-01-06 RX ORDER — NIFEDIPINE 30 MG/1
30 TABLET, EXTENDED RELEASE ORAL NIGHTLY
COMMUNITY
End: 2022-02-10

## 2022-01-06 NOTE — PROGRESS NOTES
Chief Complaint  Hypertension and Hospital Follow Up Visit (gave birth early at 34wk)    Subjective          Mendoza Jaramillo is a 31 y.o. female who presents to [facility]    History of Present Illness    HTN - well controlled.     Pt continues with breast feeding.   Anxiety uncontrolled. Pt reports compliance with med. Pt reports having excessive thoughts and worry about caring for her baby who is presently in nicu d/t being born at 34 weeks d/t preeclampsia.     Pt continues to get up daily shower and complete ADL.     PHQ-2 Total Score:     PHQ-9 Total Score:         Review of Systems   Constitutional: Negative for fever.   Psychiatric/Behavioral: Negative for dysphoric mood, sleep disturbance and suicidal ideas. The patient is nervous/anxious.           Medical History: has a past medical history of Anxiety, Depression, Esophageal ulcer without bleeding, Essential hypertension, Gastric ulcer, GERD (gastroesophageal reflux disease), Hiatal hernia (04/16/2018), Interstitial cystitis (06/02/2017), Kidney stones, Microhematuria, and Overactive bladder (06/02/2017).     Surgical History: has a past surgical history that includes [place] tooth extraction; Esophagogastroduodenoscopy (05/2017); and Colonoscopy (08/2017).     Family History: family history includes Colon cancer in an other family member; Diabetes in her maternal grandmother; Hypertension in her brother, daughter, father, and paternal grandmother; Nephrolithiasis in her mother; Other in an other family member.     Social History: reports that she has never smoked. She has never used smokeless tobacco. She reports previous alcohol use. She reports that she does not use drugs.    Allergies: Amoxicillin and Sulfate      Health Maintenance Due   Topic Date Due   • ANNUAL PHYSICAL  Never done   • TDAP/TD VACCINES (1 - Tdap) Never done   • HEPATITIS C SCREENING  Never done   • INFLUENZA VACCINE  08/01/2021   • COVID-19  "Vaccine (3 - Booster for Moderna series) 11/10/2021            Current Outpatient Medications:   •  escitalopram (LEXAPRO) 20 MG tablet, Take 1 tablet by mouth Daily., Disp: 90 tablet, Rfl: 1  •  FeroSul 325 (65 Fe) MG tablet, Take 1 tablet by mouth Every Other Day., Disp: , Rfl:   •  NIFEdipine XL (PROCARDIA XL) 30 MG 24 hr tablet, Take 30 mg by mouth Every Night., Disp: , Rfl:   •  NIFEdipine XL (PROCARDIA XL) 60 MG 24 hr tablet, Take 60 mg by mouth Every Morning., Disp: , Rfl:   •  omeprazole (priLOSEC) 20 MG capsule, TAKE 1 CAPSULE BY MOUTH EVERY DAY BEFORE A MEAL, Disp: 30 capsule, Rfl: 3  •  Prenatal MV & Min w/FA-DHA (Prenatal Adult Gummy/DHA/FA) 0.4-25 MG chewable tablet, Chew 1 each Daily., Disp: , Rfl:       Immunization History   Administered Date(s) Administered   • COVID-19 (MODERNA) 1st, 2nd, 3rd Dose Only 04/12/2021, 05/10/2021   • Influenza, Unspecified 10/07/2020, 10/07/2020         Objective       Vitals:    01/06/22 0737   BP: 122/86   Pulse: 98   Resp: 16   Temp: 98 °F (36.7 °C)   SpO2: 98%   Weight: 86.2 kg (190 lb)   Height: 157.5 cm (62\")      Body mass index is 34.75 kg/m².   Wt Readings from Last 3 Encounters:   01/06/22 86.2 kg (190 lb)   12/22/21 96.1 kg (211 lb 12.8 oz)   12/21/21 96.6 kg (213 lb)      BP Readings from Last 3 Encounters:   01/06/22 122/86   12/23/21 125/84   12/22/21 137/88        Physical Exam  Constitutional:       Appearance: Normal appearance.   HENT:      Head: Normocephalic.      Nose: Nose normal.      Mouth/Throat:      Mouth: Mucous membranes are moist.   Cardiovascular:      Rate and Rhythm: Normal rate and regular rhythm.   Pulmonary:      Effort: Pulmonary effort is normal.      Breath sounds: Normal breath sounds.   Abdominal:      General: Abdomen is flat. Bowel sounds are normal.      Palpations: Abdomen is soft.   Musculoskeletal:         General: Normal range of motion.   Skin:     General: Skin is warm and dry.   Neurological:      General: No focal " deficit present.      Mental Status: She is alert.   Psychiatric:         Attention and Perception: Attention and perception normal.         Mood and Affect: Mood is anxious. Affect is flat.         Speech: Speech normal.         Behavior: Behavior normal.         Thought Content: Thought content normal.         Cognition and Memory: Cognition normal.         Judgment: Judgment normal.             Result Review :     CMP    CMP 12/20/21 12/21/21 12/22/21   Glucose 87 110 (A) 93   BUN 9 8 10   Creatinine 0.74 0.77 0.86   eGFR Non African Am 93 89 78   Sodium 134 (A) 137 137   Potassium 4.1 3.8 4.3   Chloride 101 105 104   Calcium 9.0 8.9 9.3   Albumin 3.10 (A) 3.10 (A) 3.20 (A)   Total Bilirubin <0.2 <0.2 <0.2   Alkaline Phosphatase 112 140 (A) 114   AST (SGOT) 27 25 22   ALT (SGPT) 19 11 17   (A) Abnormal value            Lipid Panel    Lipid Panel 6/23/21   Total Cholesterol 142   Triglycerides 117   HDL Cholesterol 44   VLDL Cholesterol 21   LDL Cholesterol  77   LDL/HDL Ratio 1.70                      Assessment and Plan        Diagnoses and all orders for this visit:    1. Breast feeding status of mother (Primary)    2. Anxiety  -     escitalopram (LEXAPRO) 20 MG tablet; Take 1 tablet by mouth Daily.  Dispense: 90 tablet; Refill: 1          Follow Up     Return in about 4 weeks (around 2/3/2022) for Next scheduled follow up.    Patient was given instructions and counseling regarding her condition or for health maintenance advice. Please see specific information pulled into the AVS if appropriate.     CANDIDA Holbrook

## 2022-01-06 NOTE — PATIENT INSTRUCTIONS
"[email or web address]   Generalized Anxiety Disorder, Adult  Generalized anxiety disorder (JOSEPH) is a mental health condition. Unlike normal worries, anxiety related to JOSEPH is not triggered by a specific event. These worries do not fade or get better with time. JOSEPH interferes with relationships, work, and school.  JOSEPH symptoms can vary from mild to severe. People with severe JOSEPH can have intense waves of anxiety with physical symptoms that are similar to panic attacks.  What are the causes?  The exact cause of JOSEPH is not known, but the following are believed to have an impact:  · Differences in natural brain chemicals.  · Genes passed down from parents to children.  · Differences in the way threats are perceived.  · Development during childhood.  · Personality.  What increases the risk?  The following factors may make you more likely to develop this condition:  · Being female.  · Having a family history of anxiety disorders.  · Being very shy.  · Experiencing very stressful life events, such as the death of a loved one.  · Having a very stressful family environment.  What are the signs or symptoms?  People with JOSEPH often worry excessively about many things in their lives, such as their health and family. Symptoms may also include:  · Mental and emotional symptoms:  ? Worrying excessively about natural disasters.  ? Fear of being late.  ? Difficulty concentrating.  ? Fears that others are judging your performance.  · Physical symptoms:  ? Fatigue.  ? Headaches, muscle tension, muscle twitches, trembling, or feeling shaky.  ? Feeling like your heart is pounding or beating very fast.  ? Feeling out of breath or like you cannot take a deep breath.  ? Having trouble falling asleep or staying asleep, or experiencing restlessness.  ? Sweating.  ? Nausea, diarrhea, or irritable bowel syndrome (IBS).  · Behavioral symptoms:  ? Experiencing erratic moods or irritability.  ? Avoidance of new situations.  ? Avoidance of " people.  ? Extreme difficulty making decisions.  How is this diagnosed?  This condition is diagnosed based on your symptoms and medical history. You will also have a physical exam. Your health care provider may perform tests to rule out other possible causes of your symptoms.  To be diagnosed with JOSEPH, a person must have anxiety that:  · Is out of his or her control.  · Affects several different aspects of his or her life, such as work and relationships.  · Causes distress that makes him or her unable to take part in normal activities.  · Includes at least three symptoms of JOSEPH, such as restlessness, fatigue, trouble concentrating, irritability, muscle tension, or sleep problems.  Before your health care provider can confirm a diagnosis of JOSEPH, these symptoms must be present more days than they are not, and they must last for 6 months or longer.  How is this treated?  This condition may be treated with:  · Medicine. Antidepressant medicine is usually prescribed for long-term daily control. Anti-anxiety medicines may be added in severe cases, especially when panic attacks occur.  · Talk therapy (psychotherapy). Certain types of talk therapy can be helpful in treating JOSEPH by providing support, education, and guidance. Options include:  ? Cognitive behavioral therapy (CBT). People learn coping skills and self-calming techniques to ease their physical symptoms. They learn to identify unrealistic thoughts and behaviors and to replace them with more appropriate thoughts and behaviors.  ? Acceptance and commitment therapy (ACT). This treatment teaches people how to be mindful as a way to cope with unwanted thoughts and feelings.  ? Biofeedback. This process trains you to manage your body's response (physiological response) through breathing techniques and relaxation methods. You will work with a therapist while machines are used to monitor your physical symptoms.  · Stress management techniques. These include yoga,  meditation, and exercise.  A mental health specialist can help determine which treatment is best for you. Some people see improvement with one type of therapy. However, other people require a combination of therapies.  Follow these instructions at home:  Lifestyle  · Maintain a consistent routine and schedule.  · Anticipate stressful situations. Create a plan, and allow extra time to work with your plan.  · Practice stress management or self-calming techniques that you have learned from your therapist or your health care provider.  General instructions  · Take over-the-counter and prescription medicines only as told by your health care provider.  · Understand that you are likely to have setbacks. Accept this and be kind to yourself as you persist to take better care of yourself.  · Recognize and accept your accomplishments, even if you  them as small.  · Keep all follow-up visits as told by your health care provider. This is important.  Contact a health care provider if:  · Your symptoms do not get better.  · Your symptoms get worse.  · You have signs of depression, such as:  ? A persistently sad or irritable mood.  ? Loss of enjoyment in activities that used to bring you kenneth.  ? Change in weight or eating.  ? Changes in sleeping habits.  ? Avoiding friends or family members.  ? Loss of energy for normal tasks.  ? Feelings of guilt or worthlessness.  Get help right away if:  · You have serious thoughts about hurting yourself or others.  If you ever feel like you may hurt yourself or others, or have thoughts about taking your own life, get help right away. Go to your nearest emergency department or:  · Call your local emergency services (551 in the [place]  · Call a suicide crisis helpline, such as the National Suicide Prevention Lifeline at 1-351.184.7653. This is open 24 hours a day in the [place]  · Text the Crisis Text Line at 618839 (in the [place]  Summary  · Generalized anxiety disorder (JOSEPH) is a mental  health condition that involves worry that is not triggered by a specific event.  · People with JOSEPH often worry excessively about many things in their lives, such as their health and family.  · JOSEPH may cause symptoms such as restlessness, trouble concentrating, sleep problems, frequent sweating, nausea, diarrhea, headaches, and trembling or muscle twitching.  · A mental health specialist can help determine which treatment is best for you. Some people see improvement with one type of therapy. However, other people require a combination of therapies.  This information is not intended to replace advice given to you by your health care provider. Make sure you discuss any questions you have with your health care provider.  Document Revised: 10/07/2020 Document Reviewed: 10/07/2020  Elsevier Patient Education © 2021 [employer]

## 2022-02-10 ENCOUNTER — OFFICE VISIT (OUTPATIENT)
Dept: FAMILY MEDICINE CLINIC | Facility: CLINIC | Age: 32
End: 2022-02-10

## 2022-02-10 VITALS
SYSTOLIC BLOOD PRESSURE: 105 MMHG | HEIGHT: 62 IN | HEART RATE: 78 BPM | TEMPERATURE: 98 F | DIASTOLIC BLOOD PRESSURE: 68 MMHG | OXYGEN SATURATION: 98 % | WEIGHT: 184 LBS | BODY MASS INDEX: 33.86 KG/M2

## 2022-02-10 DIAGNOSIS — I10 ESSENTIAL HYPERTENSION: Primary | ICD-10-CM

## 2022-02-10 DIAGNOSIS — Z13.29 SCREENING FOR THYROID DISORDER: ICD-10-CM

## 2022-02-10 DIAGNOSIS — F32.A ACUTE DEPRESSION: ICD-10-CM

## 2022-02-10 DIAGNOSIS — D64.9 ANEMIA, UNSPECIFIED TYPE: ICD-10-CM

## 2022-02-10 DIAGNOSIS — F41.9 ANXIETY: ICD-10-CM

## 2022-02-10 LAB
ALBUMIN SERPL-MCNC: 4.5 G/DL (ref 3.5–5.2)
ALBUMIN/GLOB SERPL: 1.5 G/DL
ALP SERPL-CCNC: 131 U/L (ref 39–117)
ALT SERPL W P-5'-P-CCNC: 27 U/L (ref 1–33)
ANION GAP SERPL CALCULATED.3IONS-SCNC: 9.6 MMOL/L (ref 5–15)
AST SERPL-CCNC: 22 U/L (ref 1–32)
BASOPHILS # BLD AUTO: 0.07 10*3/MM3 (ref 0–0.2)
BASOPHILS NFR BLD AUTO: 0.6 % (ref 0–1.5)
BILIRUB SERPL-MCNC: 0.2 MG/DL (ref 0–1.2)
BUN SERPL-MCNC: 10 MG/DL (ref 6–20)
BUN/CREAT SERPL: 11 (ref 7–25)
CALCIUM SPEC-SCNC: 9.9 MG/DL (ref 8.6–10.5)
CHLORIDE SERPL-SCNC: 102 MMOL/L (ref 98–107)
CHOLEST SERPL-MCNC: 205 MG/DL (ref 0–200)
CO2 SERPL-SCNC: 26.4 MMOL/L (ref 22–29)
CREAT SERPL-MCNC: 0.91 MG/DL (ref 0.57–1)
DEPRECATED RDW RBC AUTO: 42.5 FL (ref 37–54)
EOSINOPHIL # BLD AUTO: 0.25 10*3/MM3 (ref 0–0.4)
EOSINOPHIL NFR BLD AUTO: 2.2 % (ref 0.3–6.2)
ERYTHROCYTE [DISTWIDTH] IN BLOOD BY AUTOMATED COUNT: 15.1 % (ref 12.3–15.4)
FERRITIN SERPL-MCNC: 20.7 NG/ML (ref 13–150)
FOLATE SERPL-MCNC: 17.4 NG/ML (ref 4.78–24.2)
GFR SERPL CREATININE-BSD FRML MDRD: 73 ML/MIN/1.73
GLOBULIN UR ELPH-MCNC: 3.1 GM/DL
GLUCOSE SERPL-MCNC: 104 MG/DL (ref 65–99)
HCT VFR BLD AUTO: 36.6 % (ref 34–46.6)
HDLC SERPL-MCNC: 40 MG/DL (ref 40–60)
HGB BLD-MCNC: 11.5 G/DL (ref 12–15.9)
IMM GRANULOCYTES # BLD AUTO: 0.03 10*3/MM3 (ref 0–0.05)
IMM GRANULOCYTES NFR BLD AUTO: 0.3 % (ref 0–0.5)
IRON 24H UR-MRATE: 30 MCG/DL (ref 37–145)
LDLC SERPL CALC-MCNC: 125 MG/DL (ref 0–100)
LDLC/HDLC SERPL: 2.99 {RATIO}
LYMPHOCYTES # BLD AUTO: 2.7 10*3/MM3 (ref 0.7–3.1)
LYMPHOCYTES NFR BLD AUTO: 23.7 % (ref 19.6–45.3)
MCH RBC QN AUTO: 24.9 PG (ref 26.6–33)
MCHC RBC AUTO-ENTMCNC: 31.4 G/DL (ref 31.5–35.7)
MCV RBC AUTO: 79.2 FL (ref 79–97)
MONOCYTES # BLD AUTO: 0.66 10*3/MM3 (ref 0.1–0.9)
MONOCYTES NFR BLD AUTO: 5.8 % (ref 5–12)
NEUTROPHILS NFR BLD AUTO: 67.4 % (ref 42.7–76)
NEUTROPHILS NFR BLD AUTO: 7.66 10*3/MM3 (ref 1.7–7)
NRBC BLD AUTO-RTO: 0 /100 WBC (ref 0–0.2)
PLATELET # BLD AUTO: 459 10*3/MM3 (ref 140–450)
PMV BLD AUTO: 10.8 FL (ref 6–12)
POTASSIUM SERPL-SCNC: 4.3 MMOL/L (ref 3.5–5.2)
PROT SERPL-MCNC: 7.6 G/DL (ref 6–8.5)
RBC # BLD AUTO: 4.62 10*6/MM3 (ref 3.77–5.28)
RETICS # AUTO: 0.06 10*6/MM3 (ref 0.02–0.13)
RETICS/RBC NFR AUTO: 1.26 % (ref 0.7–1.9)
SODIUM SERPL-SCNC: 138 MMOL/L (ref 136–145)
TRIGL SERPL-MCNC: 227 MG/DL (ref 0–150)
TSH SERPL DL<=0.05 MIU/L-ACNC: 1.47 UIU/ML (ref 0.27–4.2)
VIT B12 BLD-MCNC: 372 PG/ML (ref 211–946)
VLDLC SERPL-MCNC: 40 MG/DL (ref 5–40)
WBC NRBC COR # BLD: 11.37 10*3/MM3 (ref 3.4–10.8)

## 2022-02-10 PROCEDURE — 85045 AUTOMATED RETICULOCYTE COUNT: CPT | Performed by: NURSE PRACTITIONER

## 2022-02-10 PROCEDURE — 99214 OFFICE O/P EST MOD 30 MIN: CPT | Performed by: NURSE PRACTITIONER

## 2022-02-10 PROCEDURE — 80050 GENERAL HEALTH PANEL: CPT | Performed by: NURSE PRACTITIONER

## 2022-02-10 PROCEDURE — 80061 LIPID PANEL: CPT | Performed by: NURSE PRACTITIONER

## 2022-02-10 PROCEDURE — 83540 ASSAY OF IRON: CPT | Performed by: NURSE PRACTITIONER

## 2022-02-10 PROCEDURE — 82728 ASSAY OF FERRITIN: CPT | Performed by: NURSE PRACTITIONER

## 2022-02-10 PROCEDURE — 82746 ASSAY OF FOLIC ACID SERUM: CPT | Performed by: NURSE PRACTITIONER

## 2022-02-10 PROCEDURE — 82607 VITAMIN B-12: CPT | Performed by: NURSE PRACTITIONER

## 2022-02-10 RX ORDER — ACETAMINOPHEN AND CODEINE PHOSPHATE 120; 12 MG/5ML; MG/5ML
0.35 SOLUTION ORAL DAILY
COMMUNITY
Start: 2022-02-08 | End: 2023-02-23

## 2022-02-10 RX ORDER — LISINOPRIL 10 MG/1
10 TABLET ORAL DAILY
Qty: 90 TABLET | Refills: 1 | Status: SHIPPED | OUTPATIENT
Start: 2022-02-10 | End: 2022-07-13 | Stop reason: SDUPTHER

## 2022-02-10 NOTE — PROGRESS NOTES
Chief Complaint  Follow-up and Anxiety    Subjective          Mendoza Jaramillo is a 31 y.o. female who presents to [facility]    History of Present Illness    Anxiety - improved with medication. Pt is sleeping well.     HTN - pt has stopped breast feeding. Would like to go back to lisinopril.     Hx of anemia - pt stopped supplement d/t constipation.     PHQ-2 Total Score:     PHQ-9 Total Score:         Review of Systems   Constitutional: Negative for chills, fatigue and fever.   Respiratory: Negative for cough and shortness of breath.    Cardiovascular: Negative for chest pain and palpitations.   Gastrointestinal: Negative for constipation, diarrhea, nausea and vomiting.   Musculoskeletal: Negative for back pain and neck pain.   Skin: Negative for rash.   Neurological: Negative for dizziness and headaches.   Psychiatric/Behavioral: Negative for suicidal ideas. The patient is nervous/anxious.           Medical History: has a past medical history of Anxiety, Depression, Esophageal ulcer without bleeding, Essential hypertension, Gastric ulcer, GERD (gastroesophageal reflux disease), Hiatal hernia (04/16/2018), Interstitial cystitis (06/02/2017), Kidney stones, Microhematuria, and Overactive bladder (06/02/2017).     Surgical History: has a past surgical history that includes [place] tooth extraction; Esophagogastroduodenoscopy (05/2017); and Colonoscopy (08/2017).     Family History: family history includes Colon cancer in an other family member; Diabetes in her maternal grandmother; Hypertension in her brother, daughter, father, and paternal grandmother; Nephrolithiasis in her mother; Other in an other family member.     Social History: reports that she has never smoked. She has never used smokeless tobacco. She reports previous alcohol use. She reports that she does not use drugs.    Allergies: Sulfa antibiotics, Amoxicillin, and Sulfate      Health Maintenance Due   Topic  "Date Due   • ANNUAL PHYSICAL  Never done   • TDAP/TD VACCINES (1 - Tdap) Never done   • HEPATITIS C SCREENING  Never done   • COVID-19 Vaccine (3 - Booster for Moderna series) 10/10/2021            Current Outpatient Medications:   •  escitalopram (LEXAPRO) 20 MG tablet, Take 1 tablet by mouth Daily., Disp: 90 tablet, Rfl: 1  •  FeroSul 325 (65 Fe) MG tablet, Take 1 tablet by mouth Every Other Day., Disp: , Rfl:   •  norethindrone (MICRONOR) 0.35 MG tablet, Take 0.35 mg by mouth Daily., Disp: , Rfl:   •  omeprazole (priLOSEC) 20 MG capsule, TAKE 1 CAPSULE BY MOUTH EVERY DAY BEFORE A MEAL, Disp: 30 capsule, Rfl: 3  •  Prenatal MV & Min w/FA-DHA (Prenatal Adult Gummy/DHA/FA) 0.4-25 MG chewable tablet, Chew 1 each Daily., Disp: , Rfl:   •  lisinopril (PRINIVIL,ZESTRIL) 10 MG tablet, Take 1 tablet by mouth Daily., Disp: 90 tablet, Rfl: 1      Immunization History   Administered Date(s) Administered   • COVID-19 (MODERNA) 1st, 2nd, 3rd Dose Only 04/12/2021, 05/10/2021   • Influenza, Unspecified 10/07/2020, 10/07/2020         Objective       Vitals:    02/10/22 0744   BP: 105/68   BP Location: Left arm   Patient Position: Sitting   Cuff Size: Adult   Pulse: 78   Temp: 98 °F (36.7 °C)   TempSrc: Temporal   SpO2: 98%   Weight: 83.5 kg (184 lb)   Height: 157.5 cm (62.01\")      Body mass index is 33.65 kg/m².   Wt Readings from Last 3 Encounters:   02/10/22 83.5 kg (184 lb)   01/06/22 86.2 kg (190 lb)   12/22/21 96.1 kg (211 lb 12.8 oz)      BP Readings from Last 3 Encounters:   02/10/22 105/68   01/06/22 122/86   12/23/21 125/84        Physical Exam  Vitals reviewed.   Constitutional:       Appearance: Normal appearance. She is well-developed.   HENT:      Head: Normocephalic and atraumatic.   Eyes:      Conjunctiva/sclera: Conjunctivae normal.      Pupils: Pupils are equal, round, and reactive to light.   Cardiovascular:      Rate and Rhythm: Normal rate and regular rhythm.      Heart sounds: Normal heart sounds. No murmur " heard.      Pulmonary:      Effort: Pulmonary effort is normal.      Breath sounds: Normal breath sounds. No wheezing or rhonchi.   Abdominal:      General: Bowel sounds are normal. There is no distension.      Palpations: Abdomen is soft.      Tenderness: There is no abdominal tenderness.   Skin:     General: Skin is warm and dry.   Neurological:      Mental Status: She is alert and oriented to person, place, and time.   Psychiatric:         Mood and Affect: Mood and affect normal.         Behavior: Behavior normal.         Thought Content: Thought content normal.         Judgment: Judgment normal.             Result Review :     CMP    CMP 12/20/21 12/21/21 12/22/21   Glucose 87 110 (A) 93   BUN 9 8 10   Creatinine 0.74 0.77 0.86   eGFR Non African Am 93 89 78   Sodium 134 (A) 137 137   Potassium 4.1 3.8 4.3   Chloride 101 105 104   Calcium 9.0 8.9 9.3   Albumin 3.10 (A) 3.10 (A) 3.20 (A)   Total Bilirubin <0.2 <0.2 <0.2   Alkaline Phosphatase 112 140 (A) 114   AST (SGOT) 27 25 22   ALT (SGPT) 19 11 17   (A) Abnormal value            Lipid Panel    Lipid Panel 6/23/21   Total Cholesterol 142   Triglycerides 117   HDL Cholesterol 44   VLDL Cholesterol 21   LDL Cholesterol  77   LDL/HDL Ratio 1.70                   Assessment and Plan        Diagnoses and all orders for this visit:    1. Essential hypertension (Primary)  -     Lipid Panel  -     Comprehensive Metabolic Panel    2. Anxiety    3. Acute depression    4. Anemia, unspecified type  -     Iron  -     Vitamin B12 and Folate  -     Ferritin  -     CBC and Differential  -     Reticulocytes    5. Screening for thyroid disorder  -     TSH    Other orders  -     lisinopril (PRINIVIL,ZESTRIL) 10 MG tablet; Take 1 tablet by mouth Daily.  Dispense: 90 tablet; Refill: 1    Take 0.5 tab of Lisinopril and monitor b/p if 140/90 or higher start 1 tab daily.   Continue Lexapro as pt is doing well.    Follow Up     Return in about 4 weeks (around 3/10/2022) for Next  scheduled follow up.    Patient was given instructions and counseling regarding her condition or for health maintenance advice. Please see specific information pulled into the AVS if appropriate.     CANDIDA Holbrook

## 2022-02-16 RX ORDER — OMEPRAZOLE 20 MG/1
CAPSULE, DELAYED RELEASE ORAL
Qty: 30 CAPSULE | Refills: 3 | Status: SHIPPED | OUTPATIENT
Start: 2022-02-16 | End: 2022-07-13 | Stop reason: SDUPTHER

## 2022-03-28 PROBLEM — N76.0 VULVOVAGINITIS: Status: ACTIVE | Noted: 2022-02-08

## 2022-03-28 PROBLEM — IMO0002: Status: ACTIVE | Noted: 2022-01-11

## 2022-07-13 ENCOUNTER — LAB (OUTPATIENT)
Dept: LAB | Facility: HOSPITAL | Age: 32
End: 2022-07-13

## 2022-07-13 ENCOUNTER — OFFICE VISIT (OUTPATIENT)
Dept: FAMILY MEDICINE CLINIC | Facility: CLINIC | Age: 32
End: 2022-07-13

## 2022-07-13 VITALS
TEMPERATURE: 97.5 F | HEART RATE: 79 BPM | HEIGHT: 62 IN | WEIGHT: 180.6 LBS | OXYGEN SATURATION: 98 % | DIASTOLIC BLOOD PRESSURE: 70 MMHG | SYSTOLIC BLOOD PRESSURE: 102 MMHG | BODY MASS INDEX: 33.23 KG/M2

## 2022-07-13 DIAGNOSIS — R40.0 DAYTIME SOMNOLENCE: ICD-10-CM

## 2022-07-13 DIAGNOSIS — K21.9 GASTROESOPHAGEAL REFLUX DISEASE WITHOUT ESOPHAGITIS: ICD-10-CM

## 2022-07-13 DIAGNOSIS — I10 HYPERTENSION, UNSPECIFIED TYPE: Primary | ICD-10-CM

## 2022-07-13 DIAGNOSIS — F41.9 ANXIETY: ICD-10-CM

## 2022-07-13 DIAGNOSIS — R06.83 SNORING: ICD-10-CM

## 2022-07-13 LAB
ALBUMIN SERPL-MCNC: 4.3 G/DL (ref 3.5–5.2)
ALBUMIN/GLOB SERPL: 1.5 G/DL
ALP SERPL-CCNC: 107 U/L (ref 39–117)
ALT SERPL W P-5'-P-CCNC: 16 U/L (ref 1–33)
ANION GAP SERPL CALCULATED.3IONS-SCNC: 10.8 MMOL/L (ref 5–15)
AST SERPL-CCNC: 16 U/L (ref 1–32)
BASOPHILS # BLD AUTO: 0.05 10*3/MM3 (ref 0–0.2)
BASOPHILS NFR BLD AUTO: 0.4 % (ref 0–1.5)
BILIRUB SERPL-MCNC: 0.4 MG/DL (ref 0–1.2)
BUN SERPL-MCNC: 10 MG/DL (ref 6–20)
BUN/CREAT SERPL: 13.2 (ref 7–25)
CALCIUM SPEC-SCNC: 9 MG/DL (ref 8.6–10.5)
CHLORIDE SERPL-SCNC: 100 MMOL/L (ref 98–107)
CO2 SERPL-SCNC: 25.2 MMOL/L (ref 22–29)
CREAT SERPL-MCNC: 0.76 MG/DL (ref 0.57–1)
DEPRECATED RDW RBC AUTO: 44.7 FL (ref 37–54)
EGFRCR SERPLBLD CKD-EPI 2021: 108.9 ML/MIN/1.73
EOSINOPHIL # BLD AUTO: 0.31 10*3/MM3 (ref 0–0.4)
EOSINOPHIL NFR BLD AUTO: 2.5 % (ref 0.3–6.2)
ERYTHROCYTE [DISTWIDTH] IN BLOOD BY AUTOMATED COUNT: 15.9 % (ref 12.3–15.4)
FERRITIN SERPL-MCNC: 46.3 NG/ML (ref 13–150)
FOLATE SERPL-MCNC: 4.26 NG/ML (ref 4.78–24.2)
GLOBULIN UR ELPH-MCNC: 2.8 GM/DL
GLUCOSE SERPL-MCNC: 77 MG/DL (ref 65–99)
HCT VFR BLD AUTO: 37.1 % (ref 34–46.6)
HGB BLD-MCNC: 11.4 G/DL (ref 12–15.9)
IMM GRANULOCYTES # BLD AUTO: 0.04 10*3/MM3 (ref 0–0.05)
IMM GRANULOCYTES NFR BLD AUTO: 0.3 % (ref 0–0.5)
IRON 24H UR-MRATE: 47 MCG/DL (ref 37–145)
IRON SATN MFR SERPL: 12 % (ref 20–50)
LYMPHOCYTES # BLD AUTO: 2.8 10*3/MM3 (ref 0.7–3.1)
LYMPHOCYTES NFR BLD AUTO: 23 % (ref 19.6–45.3)
MCH RBC QN AUTO: 24.2 PG (ref 26.6–33)
MCHC RBC AUTO-ENTMCNC: 30.7 G/DL (ref 31.5–35.7)
MCV RBC AUTO: 78.6 FL (ref 79–97)
MONOCYTES # BLD AUTO: 0.72 10*3/MM3 (ref 0.1–0.9)
MONOCYTES NFR BLD AUTO: 5.9 % (ref 5–12)
NEUTROPHILS NFR BLD AUTO: 67.9 % (ref 42.7–76)
NEUTROPHILS NFR BLD AUTO: 8.24 10*3/MM3 (ref 1.7–7)
NRBC BLD AUTO-RTO: 0 /100 WBC (ref 0–0.2)
PLATELET # BLD AUTO: 339 10*3/MM3 (ref 140–450)
PMV BLD AUTO: 11.4 FL (ref 6–12)
POTASSIUM SERPL-SCNC: 4.1 MMOL/L (ref 3.5–5.2)
PROT SERPL-MCNC: 7.1 G/DL (ref 6–8.5)
RBC # BLD AUTO: 4.72 10*6/MM3 (ref 3.77–5.28)
SODIUM SERPL-SCNC: 136 MMOL/L (ref 136–145)
T4 FREE SERPL-MCNC: 0.98 NG/DL (ref 0.93–1.7)
TIBC SERPL-MCNC: 408 MCG/DL (ref 298–536)
TRANSFERRIN SERPL-MCNC: 274 MG/DL (ref 200–360)
TSH SERPL DL<=0.05 MIU/L-ACNC: 0.96 UIU/ML (ref 0.27–4.2)
VIT B12 BLD-MCNC: 270 PG/ML (ref 211–946)
WBC NRBC COR # BLD: 12.16 10*3/MM3 (ref 3.4–10.8)

## 2022-07-13 PROCEDURE — 84466 ASSAY OF TRANSFERRIN: CPT | Performed by: NURSE PRACTITIONER

## 2022-07-13 PROCEDURE — 80050 GENERAL HEALTH PANEL: CPT | Performed by: NURSE PRACTITIONER

## 2022-07-13 PROCEDURE — 84439 ASSAY OF FREE THYROXINE: CPT | Performed by: NURSE PRACTITIONER

## 2022-07-13 PROCEDURE — 99214 OFFICE O/P EST MOD 30 MIN: CPT | Performed by: NURSE PRACTITIONER

## 2022-07-13 PROCEDURE — 83540 ASSAY OF IRON: CPT | Performed by: NURSE PRACTITIONER

## 2022-07-13 PROCEDURE — 82607 VITAMIN B-12: CPT | Performed by: NURSE PRACTITIONER

## 2022-07-13 PROCEDURE — 82746 ASSAY OF FOLIC ACID SERUM: CPT | Performed by: NURSE PRACTITIONER

## 2022-07-13 PROCEDURE — 82728 ASSAY OF FERRITIN: CPT | Performed by: NURSE PRACTITIONER

## 2022-07-13 RX ORDER — LISINOPRIL 10 MG/1
10 TABLET ORAL DAILY
Qty: 90 TABLET | Refills: 1 | Status: SHIPPED | OUTPATIENT
Start: 2022-07-13 | End: 2022-08-15 | Stop reason: SDUPTHER

## 2022-07-13 RX ORDER — OMEPRAZOLE 20 MG/1
20 CAPSULE, DELAYED RELEASE ORAL DAILY
Qty: 30 CAPSULE | Refills: 3 | Status: SHIPPED | OUTPATIENT
Start: 2022-07-13 | End: 2022-08-15 | Stop reason: SDUPTHER

## 2022-07-13 RX ORDER — ESCITALOPRAM OXALATE 20 MG/1
20 TABLET ORAL DAILY
Qty: 90 TABLET | Refills: 1 | Status: SHIPPED | OUTPATIENT
Start: 2022-07-13 | End: 2023-01-31

## 2022-07-13 NOTE — PATIENT INSTRUCTIONS
Gastroesophageal Reflux Disease, Adult    Gastroesophageal reflux (STAN) happens when acid from the stomach flows up into the tube that connects the mouth and the stomach (esophagus). Normally, food travels down the esophagus and stays in the stomach to be digested. With STAN, food and stomach acid sometimes move back up into the esophagus.  You may have a disease called gastroesophageal reflux disease (GERD) if the reflux:  Happens often.  Causes frequent or very bad symptoms.  Causes problems such as damage to the esophagus.  When this happens, the esophagus becomes sore and swollen. Over time, GERD can make small holes (ulcers) in the lining of the esophagus.  What are the causes?  This condition is caused by a problem with the muscle between the esophagus and the stomach. When this muscle is weak or not normal, it does not close properly to keep food and acid from coming back up from the stomach.  The muscle can be weak because of:  Tobacco use.  Pregnancy.  Having a certain type of hernia (hiatal hernia).  Alcohol use.  Certain foods and drinks, such as coffee, chocolate, onions, and peppermint.  What increases the risk?  Being overweight.  Having a disease that affects your connective tissue.  Taking NSAIDs, such a ibuprofen.  What are the signs or symptoms?  Heartburn.  Difficult or painful swallowing.  The feeling of having a lump in the throat.  A bitter taste in the mouth.  Bad breath.  Having a lot of saliva.  Having an upset or bloated stomach.  Burping.  Chest pain. Different conditions can cause chest pain. Make sure you see your doctor if you have chest pain.  Shortness of breath or wheezing.  A long-term cough or a cough at night.  Wearing away of the surface of teeth (tooth enamel).  Weight loss.  How is this treated?  Making changes to your diet.  Taking medicine.  Having surgery.  Treatment will depend on how bad your symptoms are.  Follow these instructions at home:  Eating and drinking    Follow a  diet as told by your doctor. You may need to avoid foods and drinks such as:  Coffee and tea, with or without caffeine.  Drinks that contain alcohol.  Energy drinks and sports drinks.  Bubbly (carbonated) drinks or sodas.  Chocolate and cocoa.  Peppermint and mint flavorings.  Garlic and onions.  Horseradish.  Spicy and acidic foods. These include peppers, chili powder, tucker powder, vinegar, hot sauces, and BBQ sauce.  Citrus fruit juices and citrus fruits, such as oranges, mckenzie, and limes.  Tomato-based foods. These include red sauce, chili, salsa, and pizza with red sauce.  Fried and fatty foods. These include donuts, french fries, potato chips, and high-fat dressings.  High-fat meats. These include hot dogs, rib eye steak, sausage, ham, and phelps.  High-fat dairy items, such as whole milk, butter, and cream cheese.  Eat small meals often. Avoid eating large meals.  Avoid drinking large amounts of liquid with your meals.  Avoid eating meals during the 2-3 hours before bedtime.  Avoid lying down right after you eat.  Do not exercise right after you eat.    Lifestyle    Do not smoke or use any products that contain nicotine or tobacco. If you need help quitting, ask your doctor.  Try to lower your stress. If you need help doing this, ask your doctor.  If you are overweight, lose an amount of weight that is healthy for you. Ask your doctor about a safe weight loss goal.    General instructions  Pay attention to any changes in your symptoms.  Take over-the-counter and prescription medicines only as told by your doctor.  Do not take aspirin, ibuprofen, or other NSAIDs unless your doctor says it is okay.  Wear loose clothes. Do not wear anything tight around your waist.  Raise (elevate) the head of your bed about 6 inches (15 cm). You may need to use a wedge to do this.  Avoid bending over if this makes your symptoms worse.  Keep all follow-up visits.  Contact a doctor if:  You have new symptoms.  You lose weight and  you do not know why.  You have trouble swallowing or it hurts to swallow.  You have wheezing or a cough that keeps happening.  You have a hoarse voice.  Your symptoms do not get better with treatment.  Get help right away if:  You have sudden pain in your arms, neck, jaw, teeth, or back.  You suddenly feel sweaty, dizzy, or light-headed.  You have chest pain or shortness of breath.  You vomit and the vomit is green, yellow, or black, or it looks like blood or coffee grounds.  You faint.  Your poop (stool) is red, bloody, or black.  You cannot swallow, drink, or eat.  These symptoms may represent a serious problem that is an emergency. Do not wait to see if the symptoms will go away. Get medical help right away. Call your local emergency services (911 in the [place] Do not drive yourself to the hospital.  Summary  If a person has gastroesophageal reflux disease (GERD), food and stomach acid move back up into the esophagus and cause symptoms or problems such as damage to the esophagus.  Treatment will depend on how bad your symptoms are.  Follow a diet as told by your doctor.  Take all medicines only as told by your doctor.  This information is not intended to replace advice given to you by your health care provider. Make sure you discuss any questions you have with your health care provider.  Document Revised: 06/28/2021 Document Reviewed: 06/28/2021  [employer] Patient Education © 2021 [employer]

## 2022-07-13 NOTE — PROGRESS NOTES
Chief Complaint  Hypertension, Follow-up, and Fatigue    Subjective          Mendoza Jaramillo is a 31 y.o. female who presents to [facility]    Hypertension  Pertinent negatives include no headaches.   Fatigue  Associated symptoms include fatigue. Pertinent negatives include no headaches.   Fatigue: Patient reports fatigue that is interfering with daily life.  Reports getting 7 hours of sleep per night and does not wake up feeling rested.  Dozing off during the day anytime she is sitting still.    Anxiety/Depression:  Patient reports she is doing well on medications and experiencing no side effects.  Patient reports good mood and well controlled anxiety.      Hypertension:  Patient reports that blood pressures have been within normal limits and experiencing no side effects of antihypertensives.  Denies dizziness, headache, blurred vision.          PHQ-2 Total Score: 0   PHQ-9 Total Score: 0        Review of Systems   Constitutional: Positive for fatigue.   Eyes: Negative for visual disturbance.   Neurological: Negative for dizziness and headaches.   Psychiatric/Behavioral: Positive for sleep disturbance. Negative for dysphoric mood. The patient is not nervous/anxious.           Medical History: has a past medical history of Anxiety, Depression, Esophageal ulcer without bleeding, Essential hypertension, Gastric ulcer, GERD (gastroesophageal reflux disease), Hiatal hernia (04/16/2018), Interstitial cystitis (06/02/2017), Kidney stones, Microhematuria, and Overactive bladder (06/02/2017).     Surgical History: has a past surgical history that includes [place] tooth extraction; Esophagogastroduodenoscopy (05/2017); and Colonoscopy (08/2017).     Family History: family history includes Colon cancer in an other family member; Diabetes in her maternal grandmother; Hypertension in her brother, daughter, father, and paternal grandmother; Nephrolithiasis in her mother; Other in an other  "family member.     Social History: reports that she has never smoked. She has never used smokeless tobacco. She reports previous alcohol use. She reports that she does not use drugs.    Allergies: Sulfa antibiotics, Amoxicillin, and Sulfate      Health Maintenance Due   Topic Date Due   • ANNUAL PHYSICAL  Never done            Current Outpatient Medications:   •  escitalopram (LEXAPRO) 20 MG tablet, Take 1 tablet by mouth Daily., Disp: 90 tablet, Rfl: 1  •  FeroSul 325 (65 Fe) MG tablet, Take 1 tablet by mouth Every Other Day., Disp: , Rfl:   •  lisinopril (PRINIVIL,ZESTRIL) 10 MG tablet, Take 1 tablet by mouth Daily., Disp: 90 tablet, Rfl: 1  •  norethindrone (MICRONOR) 0.35 MG tablet, Take 0.35 mg by mouth Daily., Disp: , Rfl:   •  omeprazole (priLOSEC) 20 MG capsule, Take 1 capsule by mouth Daily. before a meal, Disp: 30 capsule, Rfl: 3      Immunization History   Administered Date(s) Administered   • COVID-19 (MODERNA) 1st, 2nd, 3rd Dose Only 04/12/2021, 05/10/2021   • Influenza, Unspecified 10/07/2020, 10/07/2020         Objective       Vitals:    07/13/22 0715   BP: 102/70   BP Location: Left arm   Patient Position: Sitting   Cuff Size: Adult   Pulse: 79   Temp: 97.5 °F (36.4 °C)   TempSrc: Temporal   SpO2: 98%   Weight: 81.9 kg (180 lb 9.6 oz)   Height: 157.5 cm (62\")      Body mass index is 33.03 kg/m².   Wt Readings from Last 3 Encounters:   07/13/22 81.9 kg (180 lb 9.6 oz)   02/10/22 83.5 kg (184 lb)   01/06/22 86.2 kg (190 lb)      BP Readings from Last 3 Encounters:   07/13/22 102/70   02/10/22 105/68   01/06/22 122/86        Physical Exam  Vitals reviewed.   Constitutional:       Appearance: Normal appearance. She is well-developed.   HENT:      Head: Normocephalic and atraumatic.   Eyes:      Conjunctiva/sclera: Conjunctivae normal.      Pupils: Pupils are equal, round, and reactive to light.   Cardiovascular:      Rate and Rhythm: Normal rate and regular rhythm.      Heart sounds: Normal heart sounds. " No murmur heard.  Pulmonary:      Effort: Pulmonary effort is normal.      Breath sounds: Normal breath sounds. No wheezing or rhonchi.   Abdominal:      General: Bowel sounds are normal. There is no distension.      Palpations: Abdomen is soft.      Tenderness: There is no abdominal tenderness.   Skin:     General: Skin is warm and dry.   Neurological:      Mental Status: She is alert and oriented to person, place, and time.   Psychiatric:         Mood and Affect: Mood and affect normal.         Behavior: Behavior normal.         Thought Content: Thought content normal.         Judgment: Judgment normal.             Result Review :       Common labs    Common Labsle 12/21/21 12/21/21 12/22/21 12/22/21 2/10/22 2/10/22 2/10/22    2313 2313 2336 2336 0840 0840 0840   Glucose  110 (A)  93   104 (A)   BUN  8  10   10   Creatinine  0.77  0.86   0.91   eGFR Non  Am  89  78   73   Sodium  137  137   138   Potassium  3.8  4.3   4.3   Chloride  105  104   102   Calcium  8.9  9.3   9.9   Albumin  3.10 (A)  3.20 (A)   4.50   Total Bilirubin  <0.2  <0.2   0.2   Alkaline Phosphatase  140 (A)  114   131 (A)   AST (SGOT)  25  22   22   ALT (SGPT)  11  17   27   WBC 13.38 (A)  13.67 (A)  11.37 (A)     Hemoglobin 10.4 (A)  11.0 (A)  11.5 (A)     Hematocrit 32.1 (A)  33.7 (A)  36.6     Platelets 291  286  459 (A)     Total Cholesterol      205 (A)    Triglycerides      227 (A)    HDL Cholesterol      40    LDL Cholesterol       125 (A)    (A) Abnormal value                         Assessment and Plan        Diagnoses and all orders for this visit:    1. Hypertension, unspecified type (Primary)  -     lisinopril (PRINIVIL,ZESTRIL) 10 MG tablet; Take 1 tablet by mouth Daily.  Dispense: 90 tablet; Refill: 1    2. Anxiety  -     escitalopram (LEXAPRO) 20 MG tablet; Take 1 tablet by mouth Daily.  Dispense: 90 tablet; Refill: 1    3. Snoring  -     Home Sleep Study; Future    4. Daytime somnolence  -     CBC & Differential  -      Comprehensive Metabolic Panel  -     TSH  -     T4, Free  -     Vitamin B12  -     Folate  -     Iron Profile  -     Ferritin  -     Home Sleep Study; Future    5. Gastroesophageal reflux disease without esophagitis  -     omeprazole (priLOSEC) 20 MG capsule; Take 1 capsule by mouth Daily. before a meal  Dispense: 30 capsule; Refill: 3    Will obtain labs and sleep study if negative will consider changing Lexapro.      Follow Up     Return in about 6 months (around 1/13/2023) for Annual physical.    Patient was given instructions and counseling regarding her condition or for health maintenance advice. Please see specific information pulled into the AVS if appropriate.     CANDIDA Holbrook

## 2022-07-13 NOTE — PROGRESS NOTES
Chief Complaint  Hypertension and Follow-up    Subjective     {Problem List  Visit Diagnosis   Encounters  Notes  Medications  Labs  Result Review Imaging  Media :23}     Mendoza Jaramillo is a 31 y.o. female who presents to [facility]    History of Present Illness      PHQ-2 Total Score: 0   PHQ-9 Total Score: 0        Review of Systems       Medical History: has a past medical history of Anxiety, Depression, Esophageal ulcer without bleeding, Essential hypertension, Gastric ulcer, GERD (gastroesophageal reflux disease), Hiatal hernia (04/16/2018), Interstitial cystitis (06/02/2017), Kidney stones, Microhematuria, and Overactive bladder (06/02/2017).     Surgical History: has a past surgical history that includes [place] tooth extraction; Esophagogastroduodenoscopy (05/2017); and Colonoscopy (08/2017).     Family History: family history includes Colon cancer in an other family member; Diabetes in her maternal grandmother; Hypertension in her brother, daughter, father, and paternal grandmother; Nephrolithiasis in her mother; Other in an other family member.     Social History: reports that she has never smoked. She has never used smokeless tobacco. She reports previous alcohol use. She reports that she does not use drugs.    Allergies: Sulfa antibiotics, Amoxicillin, and Sulfate      Health Maintenance Due   Topic Date Due   • ANNUAL PHYSICAL  Never done   • TDAP/TD VACCINES (1 - Tdap) Never done   • HEPATITIS C SCREENING  Never done   • COVID-19 Vaccine (3 - Booster for Moderna series) 10/10/2021            Current Outpatient Medications:   •  escitalopram (LEXAPRO) 20 MG tablet, Take 1 tablet by mouth Daily., Disp: 90 tablet, Rfl: 1  •  FeroSul 325 (65 Fe) MG tablet, Take 1 tablet by mouth Every Other Day., Disp: , Rfl:   •  lisinopril (PRINIVIL,ZESTRIL) 10 MG tablet, Take 1 tablet by mouth Daily. (Patient taking differently: Take 10 mg by mouth Daily. Take 1/2 tab  "daily), Disp: 90 tablet, Rfl: 1  •  norethindrone (MICRONOR) 0.35 MG tablet, Take 0.35 mg by mouth Daily., Disp: , Rfl:   •  omeprazole (priLOSEC) 20 MG capsule, TAKE 1 CAPSULE BY MOUTH EVERY DAY BEFORE A MEAL, Disp: 30 capsule, Rfl: 3      Immunization History   Administered Date(s) Administered   • COVID-19 (MODERNA) 1st, 2nd, 3rd Dose Only 04/12/2021, 05/10/2021   • Influenza, Unspecified 10/07/2020, 10/07/2020         Objective       Vitals:    07/13/22 0715   BP: 102/70   BP Location: Left arm   Patient Position: Sitting   Cuff Size: Adult   Pulse: 79   Temp: 97.5 °F (36.4 °C)   TempSrc: Temporal   SpO2: 98%   Weight: 81.9 kg (180 lb 9.6 oz)   Height: 157.5 cm (62\")      Body mass index is 33.03 kg/m².   Wt Readings from Last 3 Encounters:   07/13/22 81.9 kg (180 lb 9.6 oz)   02/10/22 83.5 kg (184 lb)   01/06/22 86.2 kg (190 lb)      BP Readings from Last 3 Encounters:   07/13/22 102/70   02/10/22 105/68   01/06/22 122/86        Physical Exam        Result Review :{Labs  Result Review  Imaging  Med Tab  Media :23}       Common labs    Common Labsle 12/21/21 12/21/21 12/22/21 12/22/21 2/10/22 2/10/22 2/10/22    2313 2313 2336 2336 0840 0840 0840   Glucose  110 (A)  93   104 (A)   BUN  8  10   10   Creatinine  0.77  0.86   0.91   eGFR Non  Am  89  78   73   Sodium  137  137   138   Potassium  3.8  4.3   4.3   Chloride  105  104   102   Calcium  8.9  9.3   9.9   Albumin  3.10 (A)  3.20 (A)   4.50   Total Bilirubin  <0.2  <0.2   0.2   Alkaline Phosphatase  140 (A)  114   131 (A)   AST (SGOT)  25  22   22   ALT (SGPT)  11  17   27   WBC 13.38 (A)  13.67 (A)  11.37 (A)     Hemoglobin 10.4 (A)  11.0 (A)  11.5 (A)     Hematocrit 32.1 (A)  33.7 (A)  36.6     Platelets 291  286  459 (A)     Total Cholesterol      205 (A)    Triglycerides      227 (A)    HDL Cholesterol      40    LDL Cholesterol       125 (A)    (A) Abnormal value              Data reviewed: ***                Assessment and Plan {CC Problem " List  Visit Diagnosis  ROS  Review (Popup)  Health Maintenance  Quality  BestPractice  Medications  SmartSets  SnapShot Encounters  Media :23}       There are no diagnoses linked to this encounter.      Follow Up {Instructions Charge Capture  Follow-up Communications :23}    No follow-ups on file.    Patient was given instructions and counseling regarding her condition or for health maintenance advice. Please see specific information pulled into the AVS if appropriate.     Yun Dillon MA

## 2022-07-14 ENCOUNTER — TELEPHONE (OUTPATIENT)
Dept: FAMILY MEDICINE CLINIC | Facility: CLINIC | Age: 32
End: 2022-07-14

## 2022-07-14 DIAGNOSIS — D50.9 IRON DEFICIENCY ANEMIA, UNSPECIFIED IRON DEFICIENCY ANEMIA TYPE: Primary | ICD-10-CM

## 2022-07-14 RX ORDER — FERROUS SULFATE 325(65) MG
325 TABLET ORAL 2 TIMES DAILY
Qty: 60 TABLET | Refills: 2 | Status: SHIPPED | OUTPATIENT
Start: 2022-07-14

## 2022-07-14 NOTE — TELEPHONE ENCOUNTER
Caller: Mendoza Jaramillo    Relationship: Self    Best call back number: [phone]    Caller requesting test results: SELF    What test was performed: LABS    When was the test performed: 07/13/2022    Where was the test performed: [facility]    Additional notes:   PATIENT IS REQUESTING CALL TO DISCUSS LAB RESULTS FROM 07/13/2022.     Detail Level: Zone

## 2022-08-15 ENCOUNTER — OFFICE VISIT (OUTPATIENT)
Dept: FAMILY MEDICINE CLINIC | Facility: CLINIC | Age: 32
End: 2022-08-15

## 2022-08-15 VITALS
HEIGHT: 62 IN | HEART RATE: 68 BPM | SYSTOLIC BLOOD PRESSURE: 124 MMHG | OXYGEN SATURATION: 99 % | BODY MASS INDEX: 32.61 KG/M2 | DIASTOLIC BLOOD PRESSURE: 75 MMHG | WEIGHT: 177.2 LBS | TEMPERATURE: 98.4 F

## 2022-08-15 DIAGNOSIS — K21.9 GASTROESOPHAGEAL REFLUX DISEASE WITHOUT ESOPHAGITIS: ICD-10-CM

## 2022-08-15 DIAGNOSIS — I10 HYPERTENSION, UNSPECIFIED TYPE: Primary | ICD-10-CM

## 2022-08-15 DIAGNOSIS — G47.30 MILD SLEEP APNEA: ICD-10-CM

## 2022-08-15 PROCEDURE — 99214 OFFICE O/P EST MOD 30 MIN: CPT | Performed by: NURSE PRACTITIONER

## 2022-08-15 RX ORDER — LISINOPRIL 5 MG/1
5 TABLET ORAL DAILY
Qty: 90 TABLET | Refills: 1 | Status: SHIPPED | OUTPATIENT
Start: 2022-08-15

## 2022-08-15 RX ORDER — OMEPRAZOLE 20 MG/1
20 CAPSULE, DELAYED RELEASE ORAL DAILY
Qty: 90 CAPSULE | Refills: 1 | Status: SHIPPED | OUTPATIENT
Start: 2022-08-15 | End: 2023-02-23 | Stop reason: SDUPTHER

## 2022-08-15 NOTE — PROGRESS NOTES
Chief Complaint  Hypertension (1 month follow up ), Heartburn (1 month follow up ), Snoring (/1 month follow up ), Anxiety (1 month follow up ), medication checkup , and labs     Subjective          Mendoza Jaramillo is a 31 y.o. female who presents to [facility]    History of Present Illness    HTn - well controlled. Pt has reduced to 5 mg and reports wnl at home. 7 lb weight loss since 2.22.     GERD - good with medication.     Anxiety well controlled. Pt denies any post partum at present. Baby is 7 months old.     Fatigue is improved. Pt has outstanding labs.     Sleep apnea - pt is waiting to get cpap.       PHQ-2 Total Score:     PHQ-9 Total Score:          Review of Systems   Constitutional: Positive for fatigue. Negative for chills and fever.   Respiratory: Negative for cough and shortness of breath.    Cardiovascular: Negative for chest pain and palpitations.   Gastrointestinal: Negative for constipation, diarrhea, nausea and vomiting.   Musculoskeletal: Negative for back pain and neck pain.   Skin: Negative for rash.   Neurological: Negative for dizziness and headaches.   Psychiatric/Behavioral: The patient is nervous/anxious.           Medical History: has a past medical history of Anxiety, Depression, Esophageal ulcer without bleeding, Essential hypertension, Gastric ulcer, GERD (gastroesophageal reflux disease), Hiatal hernia (04/16/2018), Interstitial cystitis (06/02/2017), Kidney stones, Microhematuria, and Overactive bladder (06/02/2017).     Surgical History: has a past surgical history that includes [place] tooth extraction; Esophagogastroduodenoscopy (05/2017); and Colonoscopy (08/2017).     Family History: family history includes Colon cancer in an other family member; Diabetes in her maternal grandmother; Hypertension in her brother, daughter, father, and paternal grandmother; Nephrolithiasis in her mother; Other in an other family member.     Social  "History: reports that she has never smoked. She has never used smokeless tobacco. She reports previous alcohol use. She reports that she does not use drugs.    Allergies: Sulfa antibiotics, Amoxicillin, and Sulfate      Health Maintenance Due   Topic Date Due   • ANNUAL PHYSICAL  Never done            Current Outpatient Medications:   •  escitalopram (LEXAPRO) 20 MG tablet, Take 1 tablet by mouth Daily., Disp: 90 tablet, Rfl: 1  •  ferrous sulfate 325 (65 FE) MG tablet, Take 1 tablet by mouth 2 (Two) Times a Day., Disp: 60 tablet, Rfl: 2  •  lisinopril (PRINIVIL,ZESTRIL) 5 MG tablet, Take 1 tablet by mouth Daily., Disp: 90 tablet, Rfl: 1  •  norethindrone (MICRONOR) 0.35 MG tablet, Take 0.35 mg by mouth Daily., Disp: , Rfl:   •  omeprazole (priLOSEC) 20 MG capsule, Take 1 capsule by mouth Daily. before a meal, Disp: 90 capsule, Rfl: 1      Immunization History   Administered Date(s) Administered   • COVID-19 (MODERNA) 1st, 2nd, 3rd Dose Only 04/12/2021, 05/10/2021   • Influenza, Unspecified 10/07/2020, 10/07/2020         Objective       Vitals:    08/15/22 1143   BP: 124/75   BP Location: Right arm   Patient Position: Sitting   Cuff Size: Adult   Pulse: 68   Temp: 98.4 °F (36.9 °C)   TempSrc: Temporal   SpO2: 99%   Weight: 80.4 kg (177 lb 3.2 oz)   Height: 157.5 cm (62\")      Body mass index is 32.41 kg/m².   Wt Readings from Last 3 Encounters:   08/15/22 80.4 kg (177 lb 3.2 oz)   07/13/22 81.9 kg (180 lb 9.6 oz)   02/10/22 83.5 kg (184 lb)      BP Readings from Last 3 Encounters:   08/15/22 124/75   07/13/22 102/70   02/10/22 105/68        Physical Exam  Vitals reviewed.   Constitutional:       Appearance: Normal appearance. She is well-developed.   HENT:      Head: Normocephalic and atraumatic.   Eyes:      Conjunctiva/sclera: Conjunctivae normal.      Pupils: Pupils are equal, round, and reactive to light.   Neck:      Thyroid: No thyroid mass, thyromegaly or thyroid tenderness.   Cardiovascular:      Rate and " Rhythm: Normal rate and regular rhythm.      Heart sounds: Normal heart sounds. No murmur heard.  Pulmonary:      Effort: Pulmonary effort is normal.      Breath sounds: Normal breath sounds. No wheezing or rhonchi.   Abdominal:      General: Bowel sounds are normal. There is no distension.      Palpations: Abdomen is soft.      Tenderness: There is no abdominal tenderness.   Skin:     General: Skin is warm and dry.   Neurological:      Mental Status: She is alert and oriented to person, place, and time.   Psychiatric:         Mood and Affect: Mood and affect normal.         Behavior: Behavior normal.         Thought Content: Thought content normal.         Judgment: Judgment normal.             Result Review :       Common labs    Common Labsle 12/22/21 12/22/21 2/10/22 2/10/22 2/10/22 7/13/22 7/13/22    2336 2336 0840 0840 0840 0921 0921   Glucose  93   104 (A)  77   BUN  10   10  10   Creatinine  0.86   0.91  0.76   eGFR Non African Am  78   73     Sodium  137   138  136   Potassium  4.3   4.3  4.1   Chloride  104   102  100   Calcium  9.3   9.9  9.0   Albumin  3.20 (A)   4.50  4.30   Total Bilirubin  <0.2   0.2  0.4   Alkaline Phosphatase  114   131 (A)  107   AST (SGOT)  22   22  16   ALT (SGPT)  17   27  16   WBC 13.67 (A)  11.37 (A)   12.16 (A)    Hemoglobin 11.0 (A)  11.5 (A)   11.4 (A)    Hematocrit 33.7 (A)  36.6   37.1    Platelets 286  459 (A)   339    Total Cholesterol    205 (A)      Triglycerides    227 (A)      HDL Cholesterol    40      LDL Cholesterol     125 (A)      (A) Abnormal value                         Assessment and Plan        Diagnoses and all orders for this visit:    1. Hypertension, unspecified type (Primary)  Comments:  continue lisinopril and work on weight loss.   Orders:  -     lisinopril (PRINIVIL,ZESTRIL) 5 MG tablet; Take 1 tablet by mouth Daily.  Dispense: 90 tablet; Refill: 1    2. Gastroesophageal reflux disease without esophagitis  -     omeprazole (priLOSEC) 20 MG capsule;  Take 1 capsule by mouth Daily. before a meal  Dispense: 90 capsule; Refill: 1    3. Mild sleep apnea  Comments:  wear cpap nightly.       The patient is asked to make an attempt to improve diet and exercise patterns to aid in medical management of these diagnoses.    Follow Up     Return in about 6 months (around 2/15/2023) for Next scheduled follow up.    Patient was given instructions and counseling regarding her condition or for health maintenance advice. Please see specific information pulled into the AVS if appropriate.      CANDIDA Holbrook

## 2022-08-17 ENCOUNTER — LAB (OUTPATIENT)
Dept: LAB | Facility: HOSPITAL | Age: 32
End: 2022-08-17

## 2022-08-17 DIAGNOSIS — D50.9 IRON DEFICIENCY ANEMIA, UNSPECIFIED IRON DEFICIENCY ANEMIA TYPE: ICD-10-CM

## 2022-08-17 LAB
FOLATE SERPL-MCNC: 19.2 NG/ML (ref 4.78–24.2)
IRON 24H UR-MRATE: 35 MCG/DL (ref 37–145)
IRON SATN MFR SERPL: 9 % (ref 20–50)
TIBC SERPL-MCNC: 407 MCG/DL (ref 298–536)
TRANSFERRIN SERPL-MCNC: 273 MG/DL (ref 200–360)
VIT B12 BLD-MCNC: 453 PG/ML (ref 211–946)

## 2022-08-17 PROCEDURE — 82607 VITAMIN B-12: CPT

## 2022-08-17 PROCEDURE — 83540 ASSAY OF IRON: CPT

## 2022-08-17 PROCEDURE — 36415 COLL VENOUS BLD VENIPUNCTURE: CPT

## 2022-08-17 PROCEDURE — 82746 ASSAY OF FOLIC ACID SERUM: CPT

## 2022-08-17 PROCEDURE — 84466 ASSAY OF TRANSFERRIN: CPT

## 2022-08-18 DIAGNOSIS — D50.9 IRON DEFICIENCY ANEMIA, UNSPECIFIED IRON DEFICIENCY ANEMIA TYPE: Primary | ICD-10-CM

## 2022-10-21 ENCOUNTER — CLINICAL SUPPORT (OUTPATIENT)
Dept: FAMILY MEDICINE CLINIC | Facility: CLINIC | Age: 32
End: 2022-10-21

## 2022-10-21 DIAGNOSIS — D50.9 IRON DEFICIENCY ANEMIA, UNSPECIFIED IRON DEFICIENCY ANEMIA TYPE: ICD-10-CM

## 2022-10-21 LAB
IRON 24H UR-MRATE: 71 MCG/DL (ref 37–145)
IRON SATN MFR SERPL: 18 % (ref 20–50)
TIBC SERPL-MCNC: 404 MCG/DL (ref 298–536)
TRANSFERRIN SERPL-MCNC: 271 MG/DL (ref 200–360)

## 2022-10-21 PROCEDURE — 36415 COLL VENOUS BLD VENIPUNCTURE: CPT | Performed by: NURSE PRACTITIONER

## 2022-10-21 PROCEDURE — 83540 ASSAY OF IRON: CPT | Performed by: NURSE PRACTITIONER

## 2022-10-21 PROCEDURE — 84466 ASSAY OF TRANSFERRIN: CPT | Performed by: NURSE PRACTITIONER

## 2023-01-31 DIAGNOSIS — F41.9 ANXIETY: ICD-10-CM

## 2023-01-31 RX ORDER — ESCITALOPRAM OXALATE 20 MG/1
TABLET ORAL
Qty: 90 TABLET | Refills: 1 | Status: SHIPPED | OUTPATIENT
Start: 2023-01-31 | End: 2023-02-23 | Stop reason: SDUPTHER

## 2023-02-23 ENCOUNTER — OFFICE VISIT (OUTPATIENT)
Dept: FAMILY MEDICINE CLINIC | Facility: CLINIC | Age: 33
End: 2023-02-23
Payer: COMMERCIAL

## 2023-02-23 VITALS
BODY MASS INDEX: 31.97 KG/M2 | OXYGEN SATURATION: 98 % | WEIGHT: 174.8 LBS | TEMPERATURE: 98.1 F | DIASTOLIC BLOOD PRESSURE: 74 MMHG | SYSTOLIC BLOOD PRESSURE: 116 MMHG | HEART RATE: 66 BPM

## 2023-02-23 DIAGNOSIS — Z00.00 ANNUAL PHYSICAL EXAM: Primary | ICD-10-CM

## 2023-02-23 DIAGNOSIS — F41.9 ANXIETY: ICD-10-CM

## 2023-02-23 DIAGNOSIS — K21.9 GASTROESOPHAGEAL REFLUX DISEASE WITHOUT ESOPHAGITIS: ICD-10-CM

## 2023-02-23 DIAGNOSIS — T46.4X5A COUGH DUE TO ACE INHIBITOR: ICD-10-CM

## 2023-02-23 DIAGNOSIS — R05.8 COUGH DUE TO ACE INHIBITOR: ICD-10-CM

## 2023-02-23 DIAGNOSIS — Z13.29 SCREENING FOR THYROID DISORDER: ICD-10-CM

## 2023-02-23 DIAGNOSIS — I10 HYPERTENSION, UNSPECIFIED TYPE: ICD-10-CM

## 2023-02-23 DIAGNOSIS — Z13.220 SCREENING FOR LIPOID DISORDERS: ICD-10-CM

## 2023-02-23 LAB
ALBUMIN SERPL-MCNC: 4.5 G/DL (ref 3.5–5.2)
ALBUMIN/GLOB SERPL: 1.4 G/DL
ALP SERPL-CCNC: 125 U/L (ref 39–117)
ALT SERPL W P-5'-P-CCNC: 10 U/L (ref 1–33)
ANION GAP SERPL CALCULATED.3IONS-SCNC: 8.9 MMOL/L (ref 5–15)
AST SERPL-CCNC: 12 U/L (ref 1–32)
BILIRUB SERPL-MCNC: 0.3 MG/DL (ref 0–1.2)
BUN SERPL-MCNC: 8 MG/DL (ref 6–20)
BUN/CREAT SERPL: 10.5 (ref 7–25)
CALCIUM SPEC-SCNC: 9.7 MG/DL (ref 8.6–10.5)
CHLORIDE SERPL-SCNC: 102 MMOL/L (ref 98–107)
CHOLEST SERPL-MCNC: 150 MG/DL (ref 0–200)
CO2 SERPL-SCNC: 26.1 MMOL/L (ref 22–29)
CREAT SERPL-MCNC: 0.76 MG/DL (ref 0.57–1)
DEPRECATED RDW RBC AUTO: 43.5 FL (ref 37–54)
EGFRCR SERPLBLD CKD-EPI 2021: 108.3 ML/MIN/1.73
ERYTHROCYTE [DISTWIDTH] IN BLOOD BY AUTOMATED COUNT: 13.6 % (ref 12.3–15.4)
GLOBULIN UR ELPH-MCNC: 3.2 GM/DL
GLUCOSE SERPL-MCNC: 87 MG/DL (ref 65–99)
HCT VFR BLD AUTO: 41.3 % (ref 34–46.6)
HDLC SERPL-MCNC: 39 MG/DL (ref 40–60)
HGB BLD-MCNC: 13.2 G/DL (ref 12–15.9)
LDLC SERPL CALC-MCNC: 94 MG/DL (ref 0–100)
LDLC/HDLC SERPL: 2.39 {RATIO}
MCH RBC QN AUTO: 27.9 PG (ref 26.6–33)
MCHC RBC AUTO-ENTMCNC: 32 G/DL (ref 31.5–35.7)
MCV RBC AUTO: 87.3 FL (ref 79–97)
PLATELET # BLD AUTO: 356 10*3/MM3 (ref 140–450)
PMV BLD AUTO: 11 FL (ref 6–12)
POTASSIUM SERPL-SCNC: 4.3 MMOL/L (ref 3.5–5.2)
PROT SERPL-MCNC: 7.7 G/DL (ref 6–8.5)
RBC # BLD AUTO: 4.73 10*6/MM3 (ref 3.77–5.28)
SODIUM SERPL-SCNC: 137 MMOL/L (ref 136–145)
TRIGL SERPL-MCNC: 89 MG/DL (ref 0–150)
TSH SERPL DL<=0.05 MIU/L-ACNC: 0.79 UIU/ML (ref 0.27–4.2)
VLDLC SERPL-MCNC: 17 MG/DL (ref 5–40)
WBC NRBC COR # BLD: 9.93 10*3/MM3 (ref 3.4–10.8)

## 2023-02-23 PROCEDURE — 80050 GENERAL HEALTH PANEL: CPT | Performed by: NURSE PRACTITIONER

## 2023-02-23 PROCEDURE — 80061 LIPID PANEL: CPT | Performed by: NURSE PRACTITIONER

## 2023-02-23 PROCEDURE — 36415 COLL VENOUS BLD VENIPUNCTURE: CPT | Performed by: NURSE PRACTITIONER

## 2023-02-23 PROCEDURE — 99395 PREV VISIT EST AGE 18-39: CPT | Performed by: NURSE PRACTITIONER

## 2023-02-23 RX ORDER — MONTELUKAST SODIUM 10 MG/1
10 TABLET ORAL DAILY
COMMUNITY
Start: 2022-12-28

## 2023-02-23 RX ORDER — FEXOFENADINE HCL 180 MG/1
TABLET ORAL
COMMUNITY
Start: 2023-02-15

## 2023-02-23 RX ORDER — ESCITALOPRAM OXALATE 20 MG/1
20 TABLET ORAL DAILY
Qty: 90 TABLET | Refills: 1 | Status: SHIPPED | OUTPATIENT
Start: 2023-02-23

## 2023-02-23 RX ORDER — LISINOPRIL 5 MG/1
5 TABLET ORAL DAILY
Qty: 90 TABLET | Refills: 1 | Status: CANCELLED | OUTPATIENT
Start: 2023-02-23

## 2023-02-23 RX ORDER — FAMOTIDINE 40 MG/1
40 TABLET, FILM COATED ORAL NIGHTLY
Qty: 90 TABLET | Refills: 1 | Status: SHIPPED | OUTPATIENT
Start: 2023-02-23

## 2023-02-23 RX ORDER — OMEPRAZOLE 20 MG/1
20 CAPSULE, DELAYED RELEASE ORAL DAILY
Qty: 90 CAPSULE | Refills: 1 | Status: SHIPPED | OUTPATIENT
Start: 2023-02-23

## 2023-04-25 NOTE — PROGRESS NOTES
Chief Complaint  Hypertension (Follow up)    Subjective          Mendoza Jaramillo is a 32 y.o. female who presents to [facility]    History of Present Illness    Anxiety - controlled with med. However pt does reports lexapro makes her tired. Pt would like to lose weight. Pt mother recently dx with lung nodule and is having work up for diagnosis.     GERD - flaring on occasion. Pt has started taking omeprazole with improvement.       PHQ-2 Total Score: 0   PHQ-9 Total Score: 0        Review of Systems   Constitutional:  Positive for unexpected weight change. Negative for fever.   Respiratory:  Negative for chest tightness and shortness of breath.    Cardiovascular:  Negative for chest pain, palpitations and leg swelling.   Psychiatric/Behavioral:  The patient is nervous/anxious.         Medical History: has a past medical history of Anxiety, Depression, Esophageal ulcer without bleeding, Essential hypertension, Gastric ulcer, GERD (gastroesophageal reflux disease), Hiatal hernia (04/16/2018), Interstitial cystitis (06/02/2017), Kidney stones, Microhematuria, and Overactive bladder (06/02/2017).     Surgical History: has a past surgical history that includes [place] tooth extraction; Esophagogastroduodenoscopy (05/2017); and Colonoscopy (08/2017).     Family History: family history includes Colon cancer in an other family member; Diabetes in her maternal grandmother; Hypertension in her brother, daughter, father, and paternal grandmother; Nephrolithiasis in her mother; Other in an other family member.     Social History: reports that she has never smoked. She has never used smokeless tobacco. She reports that she does not currently use alcohol. She reports that she does not use drugs.    Allergies: Sulfa antibiotics, Amoxicillin, and Sulfate      Health Maintenance Due   Topic Date Due    TDAP/TD VACCINES (1 - Tdap) Never done    HEPATITIS C SCREENING  Never done             Current Outpatient Medications:     escitalopram (LEXAPRO) 20 MG tablet, Take 1 tablet by mouth Daily., Disp: 90 tablet, Rfl: 3    ferrous sulfate 325 (65 FE) MG tablet, Take 1 tablet by mouth 2 (Two) Times a Day., Disp: 60 tablet, Rfl: 2    fexofenadine (ALLEGRA) 180 MG tablet, TAKE 1 TABLET BY MOUTH EVERY DAY AS NEEDED FOR ITCHING, SNEEZING, RUNNY NOSE, OR WATERY EYES, Disp: , Rfl:     montelukast (SINGULAIR) 10 MG tablet, Take 1 tablet by mouth Daily., Disp: , Rfl:     norethindrone (MICRONOR) 0.35 MG tablet, Take 1 tablet by mouth Daily., Disp: , Rfl:     omeprazole (priLOSEC) 20 MG capsule, Take 1 capsule by mouth Daily. before a meal, Disp: 90 capsule, Rfl: 1    buPROPion XL (Wellbutrin XL) 150 MG 24 hr tablet, Take 1 tablet by mouth Daily., Disp: 90 tablet, Rfl: 1      Immunization History   Administered Date(s) Administered    COVID-19 (MODERNA) 1st,2nd,3rd Dose Monovalent 04/12/2021, 05/10/2021    Influenza, Unspecified 10/07/2020, 10/07/2020         Objective       Vitals:    08/17/23 1050   BP: 109/71   Pulse: 72   Temp: 99.2 øF (37.3 øC)   TempSrc: Temporal   SpO2: 97%   Weight: 82.4 kg (181 lb 9.6 oz)      Body mass index is 33.22 kg/mý.   Wt Readings from Last 3 Encounters:   08/17/23 82.4 kg (181 lb 9.6 oz)   02/23/23 79.3 kg (174 lb 12.8 oz)   08/15/22 80.4 kg (177 lb 3.2 oz)      BP Readings from Last 3 Encounters:   08/17/23 109/71   02/23/23 116/74   08/15/22 124/75        Physical Exam  Vitals reviewed.   Constitutional:       Appearance: Normal appearance. She is well-developed.   HENT:      Head: Normocephalic and atraumatic.   Eyes:      Conjunctiva/sclera: Conjunctivae normal.      Pupils: Pupils are equal, round, and reactive to light.   Cardiovascular:      Rate and Rhythm: Normal rate and regular rhythm.      Heart sounds: Normal heart sounds. No murmur heard.  Pulmonary:      Effort: Pulmonary effort is normal.      Breath sounds: Normal breath sounds. No wheezing or rhonchi.    Abdominal:      General: Bowel sounds are normal. There is no distension.      Palpations: Abdomen is soft.      Tenderness: There is no abdominal tenderness.   Skin:     General: Skin is warm and dry.   Neurological:      Mental Status: She is alert and oriented to person, place, and time.   Psychiatric:         Mood and Affect: Mood and affect normal.         Behavior: Behavior normal.         Thought Content: Thought content normal.         Judgment: Judgment normal.           Result Review :       Common labs          2/23/2023    11:36 7/25/2023    09:56   Common Labs   Glucose 87     BUN 8     Creatinine 0.76     Sodium 137     Potassium 4.3     Chloride 102     Calcium 9.7     Albumin 4.5     Total Bilirubin 0.3     Alkaline Phosphatase 125     AST (SGOT) 12     ALT (SGPT) 10     WBC 9.93  10.81       Hemoglobin 13.2  13.0       Hematocrit 41.3  39.4       Platelets 356  302       Total Cholesterol 150     Triglycerides 89     HDL Cholesterol 39     LDL Cholesterol  94        Details          This result is from an external source.                              Assessment and Plan        Diagnoses and all orders for this visit:    1. Class 1 obesity due to excess calories with serious comorbidity and body mass index (BMI) of 33.0 to 33.9 in adult (Primary)  -     buPROPion XL (Wellbutrin XL) 150 MG 24 hr tablet; Take 1 tablet by mouth Daily.  Dispense: 90 tablet; Refill: 1    2. Anxiety  -     escitalopram (LEXAPRO) 20 MG tablet; Take 1 tablet by mouth Daily.  Dispense: 90 tablet; Refill: 3  -     buPROPion XL (Wellbutrin XL) 150 MG 24 hr tablet; Take 1 tablet by mouth Daily.  Dispense: 90 tablet; Refill: 1          Follow Up     Return in about 6 weeks (around 9/28/2023) for Annual physical.    Patient was given instructions and counseling regarding her condition or for health maintenance advice. Please see specific information pulled into the AVS if appropriate.     CANDIDA Holbrook

## 2023-08-17 ENCOUNTER — OFFICE VISIT (OUTPATIENT)
Dept: FAMILY MEDICINE CLINIC | Facility: CLINIC | Age: 33
End: 2023-08-17
Payer: COMMERCIAL

## 2023-08-17 VITALS
DIASTOLIC BLOOD PRESSURE: 71 MMHG | WEIGHT: 181.6 LBS | SYSTOLIC BLOOD PRESSURE: 109 MMHG | TEMPERATURE: 99.2 F | OXYGEN SATURATION: 97 % | HEART RATE: 72 BPM | BODY MASS INDEX: 33.22 KG/M2

## 2023-08-17 DIAGNOSIS — F41.9 ANXIETY: ICD-10-CM

## 2023-08-17 DIAGNOSIS — E66.09 CLASS 1 OBESITY DUE TO EXCESS CALORIES WITH SERIOUS COMORBIDITY AND BODY MASS INDEX (BMI) OF 33.0 TO 33.9 IN ADULT: Primary | ICD-10-CM

## 2023-08-17 PROCEDURE — 99213 OFFICE O/P EST LOW 20 MIN: CPT | Performed by: NURSE PRACTITIONER

## 2023-08-17 RX ORDER — ESCITALOPRAM OXALATE 20 MG/1
20 TABLET ORAL DAILY
Qty: 90 TABLET | Refills: 3 | Status: SHIPPED | OUTPATIENT
Start: 2023-08-17

## 2023-08-17 RX ORDER — BUPROPION HYDROCHLORIDE 150 MG/1
150 TABLET ORAL DAILY
Qty: 90 TABLET | Refills: 1 | Status: SHIPPED | OUTPATIENT
Start: 2023-08-17

## 2023-08-17 NOTE — PATIENT INSTRUCTIONS
Generalized Anxiety Disorder, Adult  Generalized anxiety disorder (JOSEPH) is a mental health condition. Unlike normal worries, anxiety related to JOSEPH is not triggered by a specific event. These worries do not fade or get better with time. JOSEPH interferes with relationships, work, and school.  JOSEPH symptoms can vary from mild to severe. People with severe JOSEPH can have intense waves of anxiety with physical symptoms that are similar to panic attacks.  What are the causes?  The exact cause of JOSEPH is not known, but the following are believed to have an impact:  Differences in natural brain chemicals.  Genes passed down from parents to children.  Differences in the way threats are perceived.  Development and stress during childhood.  Personality.  What increases the risk?  The following factors may make you more likely to develop this condition:  Being female.  Having a family history of anxiety disorders.  Being very shy.  Experiencing very stressful life events, such as the death of a loved one.  Having a very stressful family environment.  What are the signs or symptoms?  People with JOSEPH often worry excessively about many things in their lives, such as their health and family. Symptoms may also include:  Mental and emotional symptoms:  Worrying excessively about natural disasters.  Fear of being late.  Difficulty concentrating.  Fears that others are judging your performance.  Physical symptoms:  Fatigue.  Headaches, muscle tension, muscle twitches, trembling, or feeling shaky.  Feeling like your heart is pounding or beating very fast.  Feeling out of breath or like you cannot take a deep breath.  Having trouble falling asleep or staying asleep, or experiencing restlessness.  Sweating.  Nausea, diarrhea, or irritable bowel syndrome (IBS).  Behavioral symptoms:  Experiencing erratic moods or irritability.  Avoidance of new situations.  Avoidance of people.  Extreme difficulty making decisions.  How is this diagnosed?  This  condition is diagnosed based on your symptoms and medical history. You will also have a physical exam. Your health care provider may perform tests to rule out other possible causes of your symptoms.  To be diagnosed with JOSEPH, a person must have anxiety that:  Is out of his or her control.  Affects several different aspects of his or her life, such as work and relationships.  Causes distress that makes him or her unable to take part in normal activities.  Includes at least three symptoms of JOSEPH, such as restlessness, fatigue, trouble concentrating, irritability, muscle tension, or sleep problems.  Before your health care provider can confirm a diagnosis of JOSEPH, these symptoms must be present more days than they are not, and they must last for 6 months or longer.  How is this treated?  This condition may be treated with:  Medicine. Antidepressant medicine is usually prescribed for long-term daily control. Anti-anxiety medicines may be added in severe cases, especially when panic attacks occur.  Talk therapy (psychotherapy). Certain types of talk therapy can be helpful in treating JOSEPH by providing support, education, and guidance. Options include:  Cognitive behavioral therapy (CBT). People learn coping skills and self-calming techniques to ease their physical symptoms. They learn to identify unrealistic thoughts and behaviors and to replace them with more appropriate thoughts and behaviors.  Acceptance and commitment therapy (ACT). This treatment teaches people how to be mindful as a way to cope with unwanted thoughts and feelings.  Biofeedback. This process trains you to manage your body's response (physiological response) through breathing techniques and relaxation methods. You will work with a therapist while machines are used to monitor your physical symptoms.  Stress management techniques. These include yoga, meditation, and exercise.  A mental health specialist can help determine which treatment is best for you.  Some people see improvement with one type of therapy. However, other people require a combination of therapies.  Follow these instructions at home:  Lifestyle  Maintain a consistent routine and schedule.  Anticipate stressful situations. Create a plan and allow extra time to work with your plan.  Practice stress management or self-calming techniques that you have learned from your therapist or your health care provider.  Exercise regularly and spend time outdoors.  Eat a healthy diet that includes plenty of vegetables, fruits, whole grains, low-fat dairy products, and lean protein.  Do not eat a lot of foods that are high in fat, added sugar, or salt (sodium).  Drink plenty of water.  Avoid alcohol. Alcohol can increase anxiety.  Avoid caffeine and certain over-the-counter cold medicines. These may make you feel worse. Ask your pharmacist which medicines to avoid.  General instructions  Take over-the-counter and prescription medicines only as told by your health care provider.  Understand that you are likely to have setbacks. Accept this and be kind to yourself as you persist to take better care of yourself.  Anticipate stressful situations. Create a plan and allow extra time to work with your plan.  Recognize and accept your accomplishments, even if you  them as small.  Spend time with people who care about you.  Keep all follow-up visits. This is important.  Where to find more information  National [place] of Mental Health: www.nimh.nih.gov  Substance Abuse and Mental Health Services: www.samhsa.gov  Contact a health care provider if:  Your symptoms do not get better.  Your symptoms get worse.  You have signs of depression, such as:  A persistently sad or irritable mood.  Loss of enjoyment in activities that used to bring you kenneth.  Change in weight or eating.  Changes in sleeping habits.  Get help right away if:  You have thoughts about hurting yourself or others.  If you ever feel like you may hurt  yourself or others, or have thoughts about taking your own life, get help right away. Go to your nearest emergency department or:  Call your local emergency services (531 in the [place]  Call a suicide crisis helpline, such as the National Suicide Prevention Lifeline at 1-151.596.8526 or 833 in the [place] This is open 24 hours a day in the [place]  Text the Crisis Text Line at 644739 (in the [place]  Summary  Generalized anxiety disorder (JOSEPH) is a mental health condition that involves worry that is not triggered by a specific event.  People with JOSEPH often worry excessively about many things in their lives, such as their health and family.  JOSEPH may cause symptoms such as restlessness, trouble concentrating, sleep problems, frequent sweating, nausea, diarrhea, headaches, and trembling or muscle twitching.  A mental health specialist can help determine which treatment is best for you. Some people see improvement with one type of therapy. However, other people require a combination of therapies.  This information is not intended to replace advice given to you by your health care provider. Make sure you discuss any questions you have with your health care provider.  Document Revised: 07/13/2022 Document Reviewed: 04/10/2022  Elsevier Patient Education c 2023 [employer]

## 2023-09-28 ENCOUNTER — OFFICE VISIT (OUTPATIENT)
Dept: FAMILY MEDICINE CLINIC | Facility: CLINIC | Age: 33
End: 2023-09-28
Payer: COMMERCIAL

## 2023-09-28 VITALS
WEIGHT: 182.6 LBS | BODY MASS INDEX: 33.4 KG/M2 | SYSTOLIC BLOOD PRESSURE: 106 MMHG | DIASTOLIC BLOOD PRESSURE: 73 MMHG | OXYGEN SATURATION: 97 % | HEART RATE: 87 BPM | TEMPERATURE: 97.7 F

## 2023-09-28 DIAGNOSIS — Z23 NEED FOR INFLUENZA VACCINATION: ICD-10-CM

## 2023-09-28 DIAGNOSIS — F90.0 ATTENTION DEFICIT HYPERACTIVITY DISORDER (ADHD), PREDOMINANTLY INATTENTIVE TYPE: ICD-10-CM

## 2023-09-28 DIAGNOSIS — K21.9 GASTROESOPHAGEAL REFLUX DISEASE WITHOUT ESOPHAGITIS: Primary | ICD-10-CM

## 2023-09-28 DIAGNOSIS — F41.9 ANXIETY: ICD-10-CM

## 2023-09-28 RX ORDER — OMEPRAZOLE 40 MG/1
40 CAPSULE, DELAYED RELEASE ORAL DAILY
Qty: 90 CAPSULE | Refills: 1 | Status: SHIPPED | OUTPATIENT
Start: 2023-09-28

## 2023-09-28 RX ORDER — ONDANSETRON 4 MG/1
4 TABLET, ORALLY DISINTEGRATING ORAL EVERY 8 HOURS PRN
COMMUNITY
Start: 2023-09-20

## 2023-09-28 RX ORDER — ATOMOXETINE 25 MG/1
CAPSULE ORAL
COMMUNITY
Start: 2023-09-21

## 2023-09-28 RX ORDER — ERGOCALCIFEROL 1.25 MG/1
CAPSULE ORAL
COMMUNITY
Start: 2023-05-16

## 2023-09-28 RX ORDER — SUCRALFATE ORAL 1 G/10ML
SUSPENSION ORAL
COMMUNITY
Start: 2023-09-20

## 2023-09-28 NOTE — PATIENT INSTRUCTIONS
Generalized Anxiety Disorder, Adult  Generalized anxiety disorder (JOSEPH) is a mental health condition. Unlike normal worries, anxiety related to JOSEPH is not triggered by a specific event. These worries do not fade or get better with time. JOSEPH interferes with relationships, work, and school.  JOSEPH symptoms can vary from mild to severe. People with severe JOSEPH can have intense waves of anxiety with physical symptoms that are similar to panic attacks.  What are the causes?  The exact cause of JOSEPH is not known, but the following are believed to have an impact:  Differences in natural brain chemicals.  Genes passed down from parents to children.  Differences in the way threats are perceived.  Development and stress during childhood.  Personality.  What increases the risk?  The following factors may make you more likely to develop this condition:  Being female.  Having a family history of anxiety disorders.  Being very shy.  Experiencing very stressful life events, such as the death of a loved one.  Having a very stressful family environment.  What are the signs or symptoms?  People with JOSEPH often worry excessively about many things in their lives, such as their health and family. Symptoms may also include:  Mental and emotional symptoms:  Worrying excessively about natural disasters.  Fear of being late.  Difficulty concentrating.  Fears that others are judging your performance.  Physical symptoms:  Fatigue.  Headaches, muscle tension, muscle twitches, trembling, or feeling shaky.  Feeling like your heart is pounding or beating very fast.  Feeling out of breath or like you cannot take a deep breath.  Having trouble falling asleep or staying asleep, or experiencing restlessness.  Sweating.  Nausea, diarrhea, or irritable bowel syndrome (IBS).  Behavioral symptoms:  Experiencing erratic moods or irritability.  Avoidance of new situations.  Avoidance of people.  Extreme difficulty making decisions.  How is this diagnosed?  This  condition is diagnosed based on your symptoms and medical history. You will also have a physical exam. Your health care provider may perform tests to rule out other possible causes of your symptoms.  To be diagnosed with JOSEPH, a person must have anxiety that:  Is out of his or her control.  Affects several different aspects of his or her life, such as work and relationships.  Causes distress that makes him or her unable to take part in normal activities.  Includes at least three symptoms of JOSEPH, such as restlessness, fatigue, trouble concentrating, irritability, muscle tension, or sleep problems.  Before your health care provider can confirm a diagnosis of JOSEPH, these symptoms must be present more days than they are not, and they must last for 6 months or longer.  How is this treated?  This condition may be treated with:  Medicine. Antidepressant medicine is usually prescribed for long-term daily control. Anti-anxiety medicines may be added in severe cases, especially when panic attacks occur.  Talk therapy (psychotherapy). Certain types of talk therapy can be helpful in treating JOSEPH by providing support, education, and guidance. Options include:  Cognitive behavioral therapy (CBT). People learn coping skills and self-calming techniques to ease their physical symptoms. They learn to identify unrealistic thoughts and behaviors and to replace them with more appropriate thoughts and behaviors.  Acceptance and commitment therapy (ACT). This treatment teaches people how to be mindful as a way to cope with unwanted thoughts and feelings.  Biofeedback. This process trains you to manage your body's response (physiological response) through breathing techniques and relaxation methods. You will work with a therapist while machines are used to monitor your physical symptoms.  Stress management techniques. These include yoga, meditation, and exercise.  A mental health specialist can help determine which treatment is best for you.  Some people see improvement with one type of therapy. However, other people require a combination of therapies.  Follow these instructions at home:  Lifestyle  Maintain a consistent routine and schedule.  Anticipate stressful situations. Create a plan and allow extra time to work with your plan.  Practice stress management or self-calming techniques that you have learned from your therapist or your health care provider.  Exercise regularly and spend time outdoors.  Eat a healthy diet that includes plenty of vegetables, fruits, whole grains, low-fat dairy products, and lean protein.  Do not eat a lot of foods that are high in fat, added sugar, or salt (sodium).  Drink plenty of water.  Avoid alcohol. Alcohol can increase anxiety.  Avoid caffeine and certain over-the-counter cold medicines. These may make you feel worse. Ask your pharmacist which medicines to avoid.  General instructions  Take over-the-counter and prescription medicines only as told by your health care provider.  Understand that you are likely to have setbacks. Accept this and be kind to yourself as you persist to take better care of yourself.  Anticipate stressful situations. Create a plan and allow extra time to work with your plan.  Recognize and accept your accomplishments, even if you  them as small.  Spend time with people who care about you.  Keep all follow-up visits. This is important.  Where to find more information  National [place] of Mental Health: www.nimh.nih.gov  Substance Abuse and Mental Health Services: www.samhsa.gov  Contact a health care provider if:  Your symptoms do not get better.  Your symptoms get worse.  You have signs of depression, such as:  A persistently sad or irritable mood.  Loss of enjoyment in activities that used to bring you kenneth.  Change in weight or eating.  Changes in sleeping habits.  Get help right away if:  You have thoughts about hurting yourself or others.  If you ever feel like you may hurt  yourself or others, or have thoughts about taking your own life, get help right away. Go to your nearest emergency department or:  Call your local emergency services (201 in the [place]  Call a suicide crisis helpline, such as the National Suicide Prevention Lifeline at 1-344.588.4670 or 642 in the [place] This is open 24 hours a day in the [place]  Text the Crisis Text Line at 790708 (in the [place]  Summary  Generalized anxiety disorder (JOSEPH) is a mental health condition that involves worry that is not triggered by a specific event.  People with JOSEPH often worry excessively about many things in their lives, such as their health and family.  JOSEPH may cause symptoms such as restlessness, trouble concentrating, sleep problems, frequent sweating, nausea, diarrhea, headaches, and trembling or muscle twitching.  A mental health specialist can help determine which treatment is best for you. Some people see improvement with one type of therapy. However, other people require a combination of therapies.  This information is not intended to replace advice given to you by your health care provider. Make sure you discuss any questions you have with your health care provider.  Document Revised: 07/13/2022 Document Reviewed: 04/10/2022  Elsevier Patient Education © 2023 [employer]  Gastroesophageal Reflux Disease, Adult    Gastroesophageal reflux (STAN) happens when acid from the stomach flows up into the tube that connects the mouth and the stomach (esophagus). Normally, food travels down the esophagus and stays in the stomach to be digested. With STAN, food and stomach acid sometimes move back up into the esophagus.  You may have a disease called gastroesophageal reflux disease (GERD) if the reflux:  Happens often.  Causes frequent or very bad symptoms.  Causes problems such as damage to the esophagus.  When this happens, the esophagus becomes sore and swollen. Over time, GERD can make small holes (ulcers) in the lining of the  esophagus.  What are the causes?  This condition is caused by a problem with the muscle between the esophagus and the stomach. When this muscle is weak or not normal, it does not close properly to keep food and acid from coming back up from the stomach.  The muscle can be weak because of:  Tobacco use.  Pregnancy.  Having a certain type of hernia (hiatal hernia).  Alcohol use.  Certain foods and drinks, such as coffee, chocolate, onions, and peppermint.  What increases the risk?  Being overweight.  Having a disease that affects your connective tissue.  Taking NSAIDs, such a ibuprofen.  What are the signs or symptoms?  Heartburn.  Difficult or painful swallowing.  The feeling of having a lump in the throat.  A bitter taste in the mouth.  Bad breath.  Having a lot of saliva.  Having an upset or bloated stomach.  Burping.  Chest pain. Different conditions can cause chest pain. Make sure you see your doctor if you have chest pain.  Shortness of breath or wheezing.  A long-term cough or a cough at night.  Wearing away of the surface of teeth (tooth enamel).  Weight loss.  How is this treated?  Making changes to your diet.  Taking medicine.  Having surgery.  Treatment will depend on how bad your symptoms are.  Follow these instructions at home:  Eating and drinking    Follow a diet as told by your doctor. You may need to avoid foods and drinks such as:  Coffee and tea, with or without caffeine.  Drinks that contain alcohol.  Energy drinks and sports drinks.  Bubbly (carbonated) drinks or sodas.  Chocolate and cocoa.  Peppermint and mint flavorings.  Garlic and onions.  Horseradish.  Spicy and acidic foods. These include peppers, chili powder, tucker powder, vinegar, hot sauces, and BBQ sauce.  Citrus fruit juices and citrus fruits, such as oranges, mckenzie, and limes.  Tomato-based foods. These include red sauce, chili, salsa, and pizza with red sauce.  Fried and fatty foods. These include donuts, french fries, potato  chips, and high-fat dressings.  High-fat meats. These include hot dogs, rib eye steak, sausage, ham, and phelps.  High-fat dairy items, such as whole milk, butter, and cream cheese.  Eat small meals often. Avoid eating large meals.  Avoid drinking large amounts of liquid with your meals.  Avoid eating meals during the 2-3 hours before bedtime.  Avoid lying down right after you eat.  Do not exercise right after you eat.  Lifestyle    Do not smoke or use any products that contain nicotine or tobacco. If you need help quitting, ask your doctor.  Try to lower your stress. If you need help doing this, ask your doctor.  If you are overweight, lose an amount of weight that is healthy for you. Ask your doctor about a safe weight loss goal.  General instructions  Pay attention to any changes in your symptoms.  Take over-the-counter and prescription medicines only as told by your doctor.  Do not take aspirin, ibuprofen, or other NSAIDs unless your doctor says it is okay.  Wear loose clothes. Do not wear anything tight around your waist.  Raise (elevate) the head of your bed about 6 inches (15 cm). You may need to use a wedge to do this.  Avoid bending over if this makes your symptoms worse.  Keep all follow-up visits.  Contact a doctor if:  You have new symptoms.  You lose weight and you do not know why.  You have trouble swallowing or it hurts to swallow.  You have wheezing or a cough that keeps happening.  You have a hoarse voice.  Your symptoms do not get better with treatment.  Get help right away if:  You have sudden pain in your arms, neck, jaw, teeth, or back.  You suddenly feel sweaty, dizzy, or light-headed.  You have chest pain or shortness of breath.  You vomit and the vomit is green, yellow, or black, or it looks like blood or coffee grounds.  You faint.  Your poop (stool) is red, bloody, or black.  You cannot swallow, drink, or eat.  These symptoms may represent a serious problem that is an emergency. Do not wait  to see if the symptoms will go away. Get medical help right away. Call your local emergency services (911 in the [place] Do not drive yourself to the hospital.  Summary  If a person has gastroesophageal reflux disease (GERD), food and stomach acid move back up into the esophagus and cause symptoms or problems such as damage to the esophagus.  Treatment will depend on how bad your symptoms are.  Follow a diet as told by your doctor.  Take all medicines only as told by your doctor.  This information is not intended to replace advice given to you by your health care provider. Make sure you discuss any questions you have with your health care provider.  Document Revised: 06/28/2021 Document Reviewed: 06/28/2021  Elsevier Patient Education © 2023 [employer]

## 2023-09-28 NOTE — PROGRESS NOTES
Chief Complaint  Anxiety (Follow up)    Subjective          Mendoza Jaramillo is a 32 y.o. female who presents to [facility]    History of Present Illness    Anxiety - Patient was previously on Wellbutrin and it caused her to have chest pain. Went to [facility] and had an ADD evaluation, was told she had inattention ADD and was started on Strattera.     Patient reported going to the ER d/t severe abdominal pain at a 7/10 that wasn't relieved with Zofran and bentyl. She had thrown up around 7 times and couldn't keep anything down. She was burping very frequently with a sulfur taste. ER did an ultrasound of the gallbladder that showed hepatic steatosis. Hasn't had it again since going to the ER. Following up with GI in November.          Review of Systems   Constitutional:  Negative for chills, fatigue and fever.   Respiratory:  Negative for cough and shortness of breath.    Cardiovascular:  Negative for chest pain and palpitations.   Gastrointestinal:  Positive for abdominal pain and nausea. Negative for blood in stool, constipation, diarrhea and vomiting.   Musculoskeletal:  Negative for back pain and neck pain.   Skin:  Negative for rash.   Neurological:  Negative for dizziness and headaches.   Psychiatric/Behavioral:  Negative for suicidal ideas. The patient is nervous/anxious.         Medical History: has a past medical history of Anxiety, Depression, Esophageal ulcer without bleeding, Essential hypertension, Gastric ulcer, GERD (gastroesophageal reflux disease), Hiatal hernia (04/16/2018), Interstitial cystitis (06/02/2017), Kidney stones, Microhematuria, and Overactive bladder (06/02/2017).     Surgical History: has a past surgical history that includes [place] tooth extraction; Esophagogastroduodenoscopy (05/2017); and Colonoscopy (08/2017).     Family History: family history includes Colon cancer in an other family member; Diabetes in her maternal  grandmother; Hypertension in her brother, daughter, father, and paternal grandmother; Nephrolithiasis in her mother; Other in an other family member.     Social History: reports that she has never smoked. She has never used smokeless tobacco. She reports that she does not currently use alcohol. She reports that she does not use drugs.    Allergies: Sulfa antibiotics, Amoxicillin, and Sulfate      Health Maintenance Due   Topic Date Due    TDAP/TD VACCINES (1 - Tdap) Never done    HEPATITIS C SCREENING  Never done            Current Outpatient Medications:     atomoxetine (STRATTERA) 25 MG capsule, TAKE 1 CAPSULE BY MOUTH EVERY MORNING FOR ADHD, Disp: , Rfl:     escitalopram (LEXAPRO) 20 MG tablet, Take 1 tablet by mouth Daily., Disp: 90 tablet, Rfl: 3    ferrous sulfate 325 (65 FE) MG tablet, Take 1 tablet by mouth 2 (Two) Times a Day., Disp: 60 tablet, Rfl: 2    fexofenadine (ALLEGRA) 180 MG tablet, TAKE 1 TABLET BY MOUTH EVERY DAY AS NEEDED FOR ITCHING, SNEEZING, RUNNY NOSE, OR WATERY EYES, Disp: , Rfl:     montelukast (SINGULAIR) 10 MG tablet, Take 1 tablet by mouth Daily., Disp: , Rfl:     norethindrone (MICRONOR) 0.35 MG tablet, Take 1 tablet by mouth Daily., Disp: , Rfl:     omeprazole (priLOSEC) 40 MG capsule, Take 1 capsule by mouth Daily. before a meal, Disp: 90 capsule, Rfl: 1    ondansetron ODT (ZOFRAN-ODT) 4 MG disintegrating tablet, 1 tablet Every 8 (Eight) Hours As Needed. for nausea, Disp: , Rfl:     sucralfate (CARAFATE) 1 GM/10ML suspension, TAKE 10 ML BY MOUTH FOUR TIMES A DAY BEFORE MEALS AND AT BEDTIME, Disp: , Rfl:     vitamin D (ERGOCALCIFEROL) 1.25 MG (97164 UT) capsule capsule, , Disp: , Rfl:     buPROPion XL (Wellbutrin XL) 150 MG 24 hr tablet, Take 1 tablet by mouth Daily. (Patient not taking: Reported on 9/28/2023), Disp: 90 tablet, Rfl: 1      Immunization History   Administered Date(s) Administered    COVID-19 (MODERNA) 1st,2nd,3rd Dose Monovalent 04/12/2021, 05/10/2021    Fluzone (or  Fluarix & Flulaval for VFC) >6mos 09/28/2023    Influenza, Unspecified 10/07/2020, 10/07/2020         Objective       Vitals:    09/28/23 0818   BP: 106/73   Pulse: 87   Temp: 97.7 °F (36.5 °C)   TempSrc: Temporal   SpO2: 97%   Weight: 82.8 kg (182 lb 9.6 oz)      Body mass index is 33.4 kg/m².   Wt Readings from Last 3 Encounters:   09/28/23 82.8 kg (182 lb 9.6 oz)   08/17/23 82.4 kg (181 lb 9.6 oz)   02/23/23 79.3 kg (174 lb 12.8 oz)      BP Readings from Last 3 Encounters:   09/28/23 106/73   08/17/23 109/71   02/23/23 116/74        BMI is >= 30 and <35. (Class 1 Obesity). The following options were offered after discussion;: exercise counseling/recommendations and nutrition counseling/recommendations       Physical Exam  Vitals reviewed.   Constitutional:       Appearance: Normal appearance. She is well-developed.   HENT:      Head: Normocephalic and atraumatic.   Eyes:      Conjunctiva/sclera: Conjunctivae normal.      Pupils: Pupils are equal, round, and reactive to light.   Cardiovascular:      Rate and Rhythm: Normal rate and regular rhythm.      Heart sounds: Normal heart sounds. No murmur heard.  Pulmonary:      Effort: Pulmonary effort is normal.      Breath sounds: Normal breath sounds. No wheezing or rhonchi.   Abdominal:      General: Bowel sounds are normal. There is no distension.      Palpations: Abdomen is soft.      Tenderness: There is no abdominal tenderness.   Skin:     General: Skin is warm and dry.   Neurological:      Mental Status: She is alert and oriented to person, place, and time.   Psychiatric:         Mood and Affect: Mood and affect normal.         Behavior: Behavior normal.         Thought Content: Thought content normal.         Judgment: Judgment normal.           Result Review :       Common labs          2/23/2023    11:36 7/25/2023    09:56   Common Labs   Glucose 87     BUN 8     Creatinine 0.76     Sodium 137     Potassium 4.3     Chloride 102     Calcium 9.7     Albumin 4.5      Total Bilirubin 0.3     Alkaline Phosphatase 125     AST (SGOT) 12     ALT (SGPT) 10     WBC 9.93  10.81       Hemoglobin 13.2  13.0       Hematocrit 41.3  39.4       Platelets 356  302       Total Cholesterol 150     Triglycerides 89     HDL Cholesterol 39     LDL Cholesterol  94        Details          This result is from an external source.                            Assessment and Plan        Diagnoses and all orders for this visit:    1. Gastroesophageal reflux disease without esophagitis (Primary)  Comments:  continue carafate.  Orders:  -     omeprazole (priLOSEC) 40 MG capsule; Take 1 capsule by mouth Daily. before a meal  Dispense: 90 capsule; Refill: 1    2. Attention deficit hyperactivity disorder (ADHD), predominantly inattentive type  Comments:  continue with [facility]    3. Anxiety  Comments:  [facility]    4. Need for influenza vaccination  -     Fluzone (or Fluarix & Flulaval for VFC) >6 Mos ([id number])        Follow Up     Return in about 6 months (around 3/28/2024) for Next scheduled follow up.    Patient was given instructions and counseling regarding her condition or for health maintenance advice. Please see specific information pulled into the AVS if appropriate.     CANDIDA Holbrook

## 2023-11-03 PROCEDURE — 87086 URINE CULTURE/COLONY COUNT: CPT

## 2024-02-20 ENCOUNTER — TELEPHONE (OUTPATIENT)
Dept: FAMILY MEDICINE CLINIC | Facility: CLINIC | Age: 34
End: 2024-02-20
Payer: COMMERCIAL

## 2024-02-20 DIAGNOSIS — F41.9 ANXIETY: ICD-10-CM

## 2024-02-20 DIAGNOSIS — K21.9 GASTROESOPHAGEAL REFLUX DISEASE WITHOUT ESOPHAGITIS: ICD-10-CM

## 2024-02-20 RX ORDER — ESCITALOPRAM OXALATE 20 MG/1
20 TABLET ORAL DAILY
Qty: 90 TABLET | Refills: 3 | Status: SHIPPED | OUTPATIENT
Start: 2024-02-20

## 2024-02-20 RX ORDER — OMEPRAZOLE 40 MG/1
40 CAPSULE, DELAYED RELEASE ORAL DAILY
Qty: 90 CAPSULE | Refills: 1 | Status: SHIPPED | OUTPATIENT
Start: 2024-02-20

## 2024-02-20 NOTE — TELEPHONE ENCOUNTER
Caller: Mendoza Jaramillo    Relationship: Self    Best call back number: [phone]     Requested Prescriptions:   Requested Prescriptions     Pending Prescriptions Disp Refills    escitalopram (LEXAPRO) 20 MG tablet 90 tablet 3     Sig: Take 1 tablet by mouth Daily.    omeprazole (priLOSEC) 40 MG capsule 90 capsule 1     Sig: Take 1 capsule by mouth Daily. before a meal        Pharmacy where request should be sent: [facility] [address] [phone] [facility] [phone]      Last office visit with prescribing clinician: 9/28/2023   Last telemedicine visit with prescribing clinician: Visit date not found   Next office visit with prescribing clinician: 3/28/2024     Does the patient have less than a 3 day supply:  [x] Yes  [] No    Would you like a call back once the refill request has been completed: [] Yes [x] No    If the office needs to give you a call back, can they leave a voicemail: [] Yes [x] No    Jenna Toledo, PCT   02/20/24 10:26 EST

## 2024-03-15 PROCEDURE — 87086 URINE CULTURE/COLONY COUNT: CPT

## 2024-04-10 ENCOUNTER — LAB (OUTPATIENT)
Dept: LAB | Facility: HOSPITAL | Age: 34
End: 2024-04-10
Payer: COMMERCIAL

## 2024-04-10 ENCOUNTER — OFFICE VISIT (OUTPATIENT)
Dept: FAMILY MEDICINE CLINIC | Facility: CLINIC | Age: 34
End: 2024-04-10
Payer: COMMERCIAL

## 2024-04-10 VITALS
HEART RATE: 88 BPM | TEMPERATURE: 98.4 F | DIASTOLIC BLOOD PRESSURE: 79 MMHG | OXYGEN SATURATION: 100 % | WEIGHT: 188.4 LBS | HEIGHT: 62 IN | BODY MASS INDEX: 34.67 KG/M2 | SYSTOLIC BLOOD PRESSURE: 121 MMHG

## 2024-04-10 DIAGNOSIS — Z30.41 ORAL CONTRACEPTIVE PILL SURVEILLANCE: ICD-10-CM

## 2024-04-10 DIAGNOSIS — Z13.220 LIPID SCREENING: ICD-10-CM

## 2024-04-10 DIAGNOSIS — Z13.1 DIABETES MELLITUS SCREENING: ICD-10-CM

## 2024-04-10 DIAGNOSIS — N39.0 RECURRENT UTI: Primary | ICD-10-CM

## 2024-04-10 DIAGNOSIS — E66.09 CLASS 1 OBESITY DUE TO EXCESS CALORIES WITHOUT SERIOUS COMORBIDITY WITH BODY MASS INDEX (BMI) OF 34.0 TO 34.9 IN ADULT: ICD-10-CM

## 2024-04-10 DIAGNOSIS — Z13.29 THYROID DISORDER SCREENING: ICD-10-CM

## 2024-04-10 DIAGNOSIS — Z11.59 NEED FOR HEPATITIS C SCREENING TEST: ICD-10-CM

## 2024-04-10 LAB
ALBUMIN SERPL-MCNC: 4.4 G/DL (ref 3.5–5.2)
ALBUMIN/GLOB SERPL: 1.4 G/DL
ALP SERPL-CCNC: 117 U/L (ref 39–117)
ALT SERPL W P-5'-P-CCNC: 18 U/L (ref 1–33)
ANION GAP SERPL CALCULATED.3IONS-SCNC: 7 MMOL/L (ref 5–15)
AST SERPL-CCNC: 13 U/L (ref 1–32)
BASOPHILS # BLD AUTO: 0.07 10*3/MM3 (ref 0–0.2)
BASOPHILS NFR BLD AUTO: 0.7 % (ref 0–1.5)
BILIRUB BLD-MCNC: NEGATIVE MG/DL
BILIRUB SERPL-MCNC: 0.3 MG/DL (ref 0–1.2)
BUN SERPL-MCNC: 7 MG/DL (ref 6–20)
BUN/CREAT SERPL: 6.8 (ref 7–25)
CALCIUM SPEC-SCNC: 9.3 MG/DL (ref 8.6–10.5)
CHLORIDE SERPL-SCNC: 101 MMOL/L (ref 98–107)
CHOLEST SERPL-MCNC: 135 MG/DL (ref 0–200)
CLARITY, POC: CLEAR
CO2 SERPL-SCNC: 30 MMOL/L (ref 22–29)
COLOR UR: YELLOW
CREAT SERPL-MCNC: 1.03 MG/DL (ref 0.57–1)
DEPRECATED RDW RBC AUTO: 38.6 FL (ref 37–54)
EGFRCR SERPLBLD CKD-EPI 2021: 74.7 ML/MIN/1.73
EOSINOPHIL # BLD AUTO: 0.21 10*3/MM3 (ref 0–0.4)
EOSINOPHIL NFR BLD AUTO: 2.2 % (ref 0.3–6.2)
ERYTHROCYTE [DISTWIDTH] IN BLOOD BY AUTOMATED COUNT: 12.7 % (ref 12.3–15.4)
EXPIRATION DATE: NORMAL
GLOBULIN UR ELPH-MCNC: 3.2 GM/DL
GLUCOSE SERPL-MCNC: 94 MG/DL (ref 65–99)
GLUCOSE UR STRIP-MCNC: NEGATIVE MG/DL
HCT VFR BLD AUTO: 40.7 % (ref 34–46.6)
HCV AB SER DONR QL: NORMAL
HDLC SERPL-MCNC: 37 MG/DL (ref 40–60)
HGB BLD-MCNC: 12.9 G/DL (ref 12–15.9)
IMM GRANULOCYTES # BLD AUTO: 0.03 10*3/MM3 (ref 0–0.05)
IMM GRANULOCYTES NFR BLD AUTO: 0.3 % (ref 0–0.5)
KETONES UR QL: NEGATIVE
LDLC SERPL CALC-MCNC: 78 MG/DL (ref 0–100)
LDLC/HDLC SERPL: 2.06 {RATIO}
LEUKOCYTE EST, POC: NEGATIVE
LYMPHOCYTES # BLD AUTO: 2.59 10*3/MM3 (ref 0.7–3.1)
LYMPHOCYTES NFR BLD AUTO: 26.6 % (ref 19.6–45.3)
Lab: NORMAL
MCH RBC QN AUTO: 26.7 PG (ref 26.6–33)
MCHC RBC AUTO-ENTMCNC: 31.7 G/DL (ref 31.5–35.7)
MCV RBC AUTO: 84.3 FL (ref 79–97)
MONOCYTES # BLD AUTO: 0.52 10*3/MM3 (ref 0.1–0.9)
MONOCYTES NFR BLD AUTO: 5.3 % (ref 5–12)
NEUTROPHILS NFR BLD AUTO: 6.33 10*3/MM3 (ref 1.7–7)
NEUTROPHILS NFR BLD AUTO: 64.9 % (ref 42.7–76)
NITRITE UR-MCNC: NEGATIVE MG/ML
NRBC BLD AUTO-RTO: 0 /100 WBC (ref 0–0.2)
PH UR: 5.5 [PH] (ref 5–8)
PLATELET # BLD AUTO: 339 10*3/MM3 (ref 140–450)
PMV BLD AUTO: 10.7 FL (ref 6–12)
POTASSIUM SERPL-SCNC: 4.3 MMOL/L (ref 3.5–5.2)
PROT SERPL-MCNC: 7.6 G/DL (ref 6–8.5)
PROT UR STRIP-MCNC: NEGATIVE MG/DL
RBC # BLD AUTO: 4.83 10*6/MM3 (ref 3.77–5.28)
RBC # UR STRIP: NEGATIVE /UL
SODIUM SERPL-SCNC: 138 MMOL/L (ref 136–145)
SP GR UR: 1.03 (ref 1–1.03)
T4 FREE SERPL-MCNC: 0.96 NG/DL (ref 0.93–1.7)
TRIGL SERPL-MCNC: 108 MG/DL (ref 0–150)
TSH SERPL DL<=0.05 MIU/L-ACNC: 0.96 UIU/ML (ref 0.27–4.2)
UROBILINOGEN UR QL: NORMAL
VLDLC SERPL-MCNC: 20 MG/DL (ref 5–40)
WBC NRBC COR # BLD AUTO: 9.75 10*3/MM3 (ref 3.4–10.8)

## 2024-04-10 PROCEDURE — 84439 ASSAY OF FREE THYROXINE: CPT | Performed by: NURSE PRACTITIONER

## 2024-04-10 PROCEDURE — 85025 COMPLETE CBC W/AUTO DIFF WBC: CPT | Performed by: NURSE PRACTITIONER

## 2024-04-10 PROCEDURE — 84443 ASSAY THYROID STIM HORMONE: CPT | Performed by: NURSE PRACTITIONER

## 2024-04-10 PROCEDURE — 80053 COMPREHEN METABOLIC PANEL: CPT | Performed by: NURSE PRACTITIONER

## 2024-04-10 PROCEDURE — 86803 HEPATITIS C AB TEST: CPT | Performed by: NURSE PRACTITIONER

## 2024-04-10 PROCEDURE — 80061 LIPID PANEL: CPT | Performed by: NURSE PRACTITIONER

## 2024-04-10 RX ORDER — ESTRADIOL 0.1 MG/G
2 CREAM VAGINAL DAILY
Qty: 42.5 G | Refills: 1 | Status: SHIPPED | OUTPATIENT
Start: 2024-04-10

## 2024-04-10 RX ORDER — NORETHINDRONE ACETATE AND ETHINYL ESTRADIOL 1MG-20(21)
1 KIT ORAL DAILY
Qty: 84 TABLET | Refills: 3 | Status: SHIPPED | OUTPATIENT
Start: 2024-04-10 | End: 2025-04-10

## 2024-04-10 RX ORDER — NITROFURANTOIN 25; 75 MG/1; MG/1
100 CAPSULE ORAL NIGHTLY PRN
Qty: 30 CAPSULE | Refills: 1 | Status: SHIPPED | OUTPATIENT
Start: 2024-04-10

## 2024-04-10 NOTE — PROGRESS NOTES
Chief Complaint  Gastroesophageal reflux disease without esophagitis (6mo f/i)    Subjective          Mendoza Jaramillo is a 33 y.o. female who presents to [facility]    History of Present Illness    GERD: doing well on omeprazole   Has noticed weight slowly creeping up, usually keeps steady around 182.  Has a 2 year old at home that she keeps active with but states doesn't work out.  Has a Cpap at home that she uses. Doing well with Cpap at home.  Has noticed that it is helping a lot. Has noticed that she gets a little dizzy when she walks if she looks down then back up quickly.     PHQ-2 Total Score: 0   PHQ-9 Total Score: 0        Review of Systems   Constitutional:  Negative for chills, fatigue and fever.   Respiratory:  Negative for cough and shortness of breath.    Cardiovascular:  Negative for chest pain and palpitations.   Gastrointestinal:  Negative for constipation, diarrhea, nausea and vomiting.   Musculoskeletal:  Negative for back pain and neck pain.   Skin:  Negative for rash.   Neurological:  Negative for dizziness and headaches.          Medical History: has a past medical history of Anxiety, Depression, Esophageal ulcer without bleeding, Essential hypertension, Gastric ulcer, GERD (gastroesophageal reflux disease), Hiatal hernia (04/16/2018), Interstitial cystitis (06/02/2017), Kidney stones, Microhematuria, and Overactive bladder (06/02/2017).     Surgical History: has a past surgical history that includes [place] tooth extraction; Esophagogastroduodenoscopy (05/2017); and Colonoscopy (08/2017).     Family History: family history includes Colon cancer in an other family member; Diabetes in her maternal grandmother; Hypertension in her brother, daughter, father, and paternal grandmother; Nephrolithiasis in her mother; Other in an other family member.     Social History: reports that she has never smoked. She has never used smokeless tobacco. She reports that  "she does not currently use alcohol. She reports that she does not use drugs.    Allergies: Sulfa antibiotics, Amoxicillin, and Sulfate      Health Maintenance Due   Topic Date Due    TDAP/TD VACCINES (1 - Tdap) Never done    HEPATITIS C SCREENING  Never done    PAP SMEAR  12/15/2023    ANNUAL PHYSICAL  02/23/2024            Current Outpatient Medications:     atomoxetine (STRATTERA) 25 MG capsule, TAKE 1 CAPSULE BY MOUTH EVERY MORNING FOR ADHD, Disp: , Rfl:     escitalopram (LEXAPRO) 20 MG tablet, Take 1 tablet by mouth Daily., Disp: 90 tablet, Rfl: 3    fexofenadine (ALLEGRA) 180 MG tablet, TAKE 1 TABLET BY MOUTH EVERY DAY AS NEEDED FOR ITCHING, SNEEZING, RUNNY NOSE, OR WATERY EYES, Disp: , Rfl:     omeprazole (priLOSEC) 40 MG capsule, Take 1 capsule by mouth Daily. before a meal, Disp: 90 capsule, Rfl: 1    estradiol (ESTRACE VAGINAL) 0.1 MG/GM vaginal cream, Insert 2 g into the vagina Daily., Disp: 42.5 g, Rfl: 1    nitrofurantoin, macrocrystal-monohydrate, (Macrobid) 100 MG capsule, Take 1 capsule by mouth At Night As Needed (after intercourse)., Disp: 30 capsule, Rfl: 1    norethindrone-ethinyl estradiol FE (JUNEL FE 1/20) 1-20 MG-MCG per tablet, Take 1 tablet by mouth Daily., Disp: 84 tablet, Rfl: 3      Immunization History   Administered Date(s) Administered    COVID-19 (MODERNA) 1st,2nd,3rd Dose Monovalent 04/12/2021, 05/10/2021    Fluzone (or Fluarix & Flulaval for VFC) >6mos 09/28/2023    Influenza, Unspecified 10/07/2020, 10/07/2020         Objective       Vitals:    04/10/24 0805   BP: 121/79   BP Location: Left arm   Patient Position: Sitting   Cuff Size: Adult   Pulse: 88   Temp: 98.4 °F (36.9 °C)   TempSrc: Temporal   SpO2: 100%   Weight: 85.5 kg (188 lb 6.4 oz)   Height: 157.5 cm (62\")   PainSc: 0-No pain      Body mass index is 34.46 kg/m².   Wt Readings from Last 3 Encounters:   04/10/24 85.5 kg (188 lb 6.4 oz)   03/15/24 81.6 kg (180 lb)   11/03/23 81.6 kg (180 lb)      BP Readings from Last 3 " Encounters:   04/10/24 121/79   03/15/24 127/82   11/03/23 135/91                Physical Exam  Vitals reviewed.   Constitutional:       Appearance: Normal appearance.   HENT:      Head: Normocephalic and atraumatic.   Cardiovascular:      Rate and Rhythm: Normal rate and regular rhythm.      Pulses: Normal pulses.      Heart sounds: Normal heart sounds.   Pulmonary:      Effort: Pulmonary effort is normal.      Breath sounds: Normal breath sounds.   Skin:     General: Skin is warm and dry.   Neurological:      Mental Status: She is alert and oriented to person, place, and time.   Psychiatric:         Mood and Affect: Mood normal.         Behavior: Behavior normal.         Thought Content: Thought content normal.         Judgment: Judgment normal.             Result Review :       Common labs          7/25/2023    09:56   Common Labs   WBC 10.81       Hemoglobin 13.0       Hematocrit 39.4       Platelets 302          Details          This result is from an external source.                              Assessment and Plan        Diagnoses and all orders for this visit:    1. Recurrent UTI (Primary)  -     POCT urinalysis dipstick, automated  -     estradiol (ESTRACE VAGINAL) 0.1 MG/GM vaginal cream; Insert 2 g into the vagina Daily.  Dispense: 42.5 g; Refill: 1  -     nitrofurantoin, macrocrystal-monohydrate, (Macrobid) 100 MG capsule; Take 1 capsule by mouth At Night As Needed (after intercourse).  Dispense: 30 capsule; Refill: 1    2. Oral contraceptive pill surveillance  -     norethindrone-ethinyl estradiol FE (JUNEL FE 1/20) 1-20 MG-MCG per tablet; Take 1 tablet by mouth Daily.  Dispense: 84 tablet; Refill: 3    3. Class 1 obesity due to excess calories without serious comorbidity with body mass index (BMI) of 34.0 to 34.9 in adult  Comments:  Keep food journal, calorie counting    4. Diabetes mellitus screening  -     Comprehensive Metabolic Panel    5. Lipid screening  -     CBC & Differential  -     Lipid  Panel    6. Thyroid disorder screening  -     TSH  -     T4, free    7. Need for hepatitis C screening test  -     Hepatitis C Antibody          Follow Up     Return if symptoms worsen or fail to improve.    Patient was given instructions and counseling regarding her condition or for health maintenance advice. Please see specific information pulled into the AVS if appropriate. Patient understands the importance of having any ordered tests to be performed in a timely fashion.        CANDIDA Taylor    Answers submitted by the patient for this visit:  Other (Submitted on 4/9/2024)  Please describe your symptoms.: Frequent UTI’s/nausea , Headaches , Dizzy spells, Sinus pressure in face, Chest pain  Have you had these symptoms before?: Yes  How long have you been having these symptoms?: Greater than 2 weeks  Please list any medications you are currently taking for this condition.: None  Primary Reason for Visit (Submitted on 4/9/2024)  What is the primary reason for your visit?: Other

## 2024-04-12 ENCOUNTER — PATIENT ROUNDING (BHMG ONLY) (OUTPATIENT)
Dept: FAMILY MEDICINE CLINIC | Facility: CLINIC | Age: 34
End: 2024-04-12
Payer: COMMERCIAL

## 2024-04-12 NOTE — PROGRESS NOTES
A My-Chart message has been sent to the patient for PATIENT ROUNDING with [facility].

## 2024-04-29 NOTE — PROGRESS NOTES
Chief Complaint    Annual Exam  Annual Exam, Heartburn, and Obesity    Subjective          Mendoza Jaramillo is a 33 y.o. female who presents to [facility]    Annual Exam    History of Present Illness  The patient is a 33-year-old female who presents to the office for follow-up of multiple medical concerns.    The patient has been actively trying to lose weight, resulting in a weight loss of 6 pounds. She has made dietary modifications, including the 21-day Beach Body program, and has incorporated regular exercise into her routine.    The patient discontinued her birth control regimen due to personal reasons, citing personal reasons.     Her blood pressure is normal. She was previously on a low-dose blood pressure medication, which was discontinued due to hypotension following her son's birth.     She maintains biannual dental check-ups, with the most recent one occurring this year. Her last ophthalmological examination was conducted the previous year.    The patient's gastroesophageal reflux disease (GERD) symptoms have improved following the elimination of processed foods from her diet. She reports no issues with Irritable Bowel Syndrome (IBS). She no longer experiences abdominal distension, and her stomach appears flat. Her reflux symptoms are well-managed with omeprazole.    The patient's anxiety is well-managed with Lexapro. She reports satisfactory sleep quality. Her anxiety symptoms are well-managed.    The patient reports improved concentration and is under the care of [facility]. She has increased her Strattera dosage to 40 mg.           Review of Systems   Constitutional:  Negative for chills, fatigue and fever.   Respiratory:  Negative for cough and shortness of breath.    Cardiovascular:  Negative for chest pain and palpitations.   Gastrointestinal:  Negative for constipation, diarrhea, nausea and vomiting.   Musculoskeletal:  Negative for back pain and neck  pain.   Skin:  Negative for rash.   Neurological:  Negative for dizziness and headaches.   Psychiatric/Behavioral:  Negative for self-injury, sleep disturbance and suicidal ideas. The patient is nervous/anxious.           Medical History: has a past medical history of Anxiety, Depression, Esophageal ulcer without bleeding, Essential hypertension, Gastric ulcer, GERD (gastroesophageal reflux disease), Hiatal hernia (04/16/2018), Interstitial cystitis (06/02/2017), Kidney stones, Microhematuria, and Overactive bladder (06/02/2017).     Surgical History: has a past surgical history that includes [place] tooth extraction; Esophagogastroduodenoscopy (05/2017); and Colonoscopy (08/2017).     Family History: family history includes Colon cancer in an other family member; Diabetes in her maternal grandmother; Hypertension in her brother, daughter, father, and paternal grandmother; Nephrolithiasis in her mother; Other in an other family member.     Social History: reports that she has never smoked. She has never used smokeless tobacco. She reports that she does not currently use alcohol. She reports that she does not use drugs.    Allergies: Sulfa antibiotics, Amoxicillin, and Sulfate      Health Maintenance Due   Topic Date Due    TDAP/TD VACCINES (1 - Tdap) Never done    PAP SMEAR  12/15/2023    ANNUAL PHYSICAL  02/23/2024            Current Outpatient Medications:     atomoxetine (STRATTERA) 40 MG capsule, Take 1 capsule by mouth Daily., Disp: , Rfl:     escitalopram (LEXAPRO) 20 MG tablet, Take 1 tablet by mouth Daily., Disp: 90 tablet, Rfl: 3    estradiol (ESTRACE VAGINAL) 0.1 MG/GM vaginal cream, Insert 2 g into the vagina Daily., Disp: 42.5 g, Rfl: 1    fexofenadine (ALLEGRA) 180 MG tablet, TAKE 1 TABLET BY MOUTH EVERY DAY AS NEEDED FOR ITCHING, SNEEZING, RUNNY NOSE, OR WATERY EYES, Disp: , Rfl:     omeprazole (priLOSEC) 40 MG capsule, Take 1 capsule by mouth Daily. before a meal, Disp: 90 capsule, Rfl: 1     "terbinafine (lamiSIL) 250 MG tablet, Take 1 tablet by mouth Daily., Disp: , Rfl:       Immunization History   Administered Date(s) Administered    COVID-19 (MODERNA) 1st,2nd,3rd Dose Monovalent 04/12/2021, 05/10/2021    Fluzone (or Fluarix & Flulaval for VFC) >6mos 09/28/2023    Influenza, Unspecified 10/07/2020, 10/07/2020         Objective       Vitals:    05/16/24 0822   BP: 116/79   Pulse: 94   Temp: 99.3 °F (37.4 °C)   SpO2: 98%   Weight: 83 kg (183 lb)   Height: 157.5 cm (62\")      Body mass index is 33.47 kg/m².   Wt Readings from Last 3 Encounters:   05/16/24 83 kg (183 lb)   04/10/24 85.5 kg (188 lb 6.4 oz)   03/15/24 81.6 kg (180 lb)      BP Readings from Last 3 Encounters:   05/16/24 116/79   04/10/24 121/79   03/15/24 127/82                 Physical Exam  Vitals reviewed.   Constitutional:       Appearance: Normal appearance. She is well-developed.   HENT:      Head: Normocephalic and atraumatic.   Eyes:      Conjunctiva/sclera: Conjunctivae normal.      Pupils: Pupils are equal, round, and reactive to light.   Cardiovascular:      Rate and Rhythm: Normal rate and regular rhythm.      Heart sounds: Normal heart sounds. No murmur heard.  Pulmonary:      Effort: Pulmonary effort is normal.      Breath sounds: Normal breath sounds. No wheezing or rhonchi.   Abdominal:      General: Bowel sounds are normal. There is no distension.      Palpations: Abdomen is soft.      Tenderness: There is no abdominal tenderness.   Skin:     General: Skin is warm and dry.   Neurological:      Mental Status: She is alert and oriented to person, place, and time.   Psychiatric:         Mood and Affect: Mood and affect normal.         Behavior: Behavior normal.         Thought Content: Thought content normal.         Judgment: Judgment normal.       Physical Exam  Vital Signs  The patient's blood pressure is 116/79.      Result Review :       Common labs          7/25/2023    09:56 4/10/2024    09:35   Common Labs   Glucose  94 "    BUN  7    Creatinine  1.03    Sodium  138    Potassium  4.3    Chloride  101    Calcium  9.3    Albumin  4.4    Total Bilirubin  0.3    Alkaline Phosphatase  117    AST (SGOT)  13    ALT (SGPT)  18    WBC 10.81     9.75    Hemoglobin 13.0     12.9    Hematocrit 39.4     40.7    Platelets 302     339    Total Cholesterol  135    Triglycerides  108    HDL Cholesterol  37    LDL Cholesterol   78       Details          This result is from an external source.             Results  Laboratory Studies  Cholesterol was good except for the HDL was low. Thyroid was normal. Hepatitis C antibody was negative.                 Assessment and Plan      Assessment & Plan  1. Health maintenance.  The patient was counseled on the importance of regular physical activity to enhance her HDL levels.    2. Gastroesophageal Reflux Disease (GERD).  The patient was educated on the role of inflammation in body from processed foods. A prescription refill for omeprazole was provided.    3. Anxiety.  The patient's anxiety appears to be well-managed with Lexapro. The patient was advised to maintain her current Lexapro regimen.    Diagnoses and all orders for this visit:    1. Annual physical exam (Primary)    2. Gastroesophageal reflux disease without esophagitis  Comments:  continue carafate.  Orders:  -     omeprazole (priLOSEC) 40 MG capsule; Take 1 capsule by mouth Daily. before a meal  Dispense: 90 capsule; Refill: 1    3. Anxiety  Assessment & Plan:  Pt is doing well and will continue lexapro. Continue with [facility].      4. Encounter for screening for cardiovascular disorders  -     CT Cardiac Calcium Score Without Dye; Future          Follow Up     Return in about 1 year (around 5/16/2025) for Annual physical.    Updated annual wellness visit checklist.  Immunizations discussed.  Screening discussed and/or ordered.  Recommend yearly dental and eye exams. Also discussed monitoring of blood pressure and lipids.      Patient was given  instructions and counseling regarding her condition or for health maintenance advice. Please see specific information pulled into the AVS if appropriate.     Patient or patient representative verbalized consent for the use of Ambient Listening during the visit with  CANDIDA Holbrook for chart documentation. 5/16/2024  08:49 EDT    CANDIDA Holbrook

## 2024-05-16 ENCOUNTER — OFFICE VISIT (OUTPATIENT)
Dept: FAMILY MEDICINE CLINIC | Facility: CLINIC | Age: 34
End: 2024-05-16
Payer: COMMERCIAL

## 2024-05-16 VITALS
WEIGHT: 183 LBS | TEMPERATURE: 99.3 F | DIASTOLIC BLOOD PRESSURE: 79 MMHG | OXYGEN SATURATION: 98 % | BODY MASS INDEX: 33.68 KG/M2 | HEIGHT: 62 IN | HEART RATE: 94 BPM | SYSTOLIC BLOOD PRESSURE: 116 MMHG

## 2024-05-16 DIAGNOSIS — F41.9 ANXIETY: ICD-10-CM

## 2024-05-16 DIAGNOSIS — K21.9 GASTROESOPHAGEAL REFLUX DISEASE WITHOUT ESOPHAGITIS: ICD-10-CM

## 2024-05-16 DIAGNOSIS — Z00.00 ANNUAL PHYSICAL EXAM: Primary | ICD-10-CM

## 2024-05-16 DIAGNOSIS — Z13.6 ENCOUNTER FOR SCREENING FOR CARDIOVASCULAR DISORDERS: ICD-10-CM

## 2024-05-16 PROCEDURE — 99395 PREV VISIT EST AGE 18-39: CPT | Performed by: NURSE PRACTITIONER

## 2024-05-16 RX ORDER — TERBINAFINE HYDROCHLORIDE 250 MG/1
250 TABLET ORAL DAILY
COMMUNITY
Start: 2024-04-11

## 2024-05-16 RX ORDER — OMEPRAZOLE 40 MG/1
40 CAPSULE, DELAYED RELEASE ORAL DAILY
Qty: 90 CAPSULE | Refills: 1 | Status: SHIPPED | OUTPATIENT
Start: 2024-05-16

## 2024-05-23 ENCOUNTER — HOSPITAL ENCOUNTER (OUTPATIENT)
Dept: CT IMAGING | Facility: HOSPITAL | Age: 34
Discharge: HOME | End: 2024-05-23
Admitting: NURSE PRACTITIONER

## 2024-05-23 DIAGNOSIS — Z13.6 ENCOUNTER FOR SCREENING FOR CARDIOVASCULAR DISORDERS: ICD-10-CM

## 2024-05-23 PROCEDURE — 75571 CT HRT W/O DYE W/CA TEST: CPT

## 2024-06-20 ENCOUNTER — LAB (OUTPATIENT)
Dept: LAB | Facility: HOSPITAL | Age: 34
End: 2024-06-20
Payer: COMMERCIAL

## 2024-06-20 ENCOUNTER — TRANSCRIBE ORDERS (OUTPATIENT)
Dept: ADMINISTRATIVE | Facility: HOSPITAL | Age: 34
End: 2024-06-20
Payer: COMMERCIAL

## 2024-06-20 DIAGNOSIS — B35.1 TOENAIL FUNGUS: ICD-10-CM

## 2024-06-20 DIAGNOSIS — B35.1 TOENAIL FUNGUS: Primary | ICD-10-CM

## 2024-06-20 LAB — 25(OH)D3 SERPL-MCNC: 31.5 NG/ML (ref 30–100)

## 2024-06-20 PROCEDURE — 36415 COLL VENOUS BLD VENIPUNCTURE: CPT

## 2024-06-20 PROCEDURE — 82306 VITAMIN D 25 HYDROXY: CPT

## 2024-08-12 ENCOUNTER — OFFICE VISIT (OUTPATIENT)
Dept: FAMILY MEDICINE CLINIC | Facility: CLINIC | Age: 34
End: 2024-08-12
Payer: COMMERCIAL

## 2024-08-12 VITALS
OXYGEN SATURATION: 98 % | WEIGHT: 183.4 LBS | HEART RATE: 82 BPM | HEIGHT: 62 IN | BODY MASS INDEX: 33.75 KG/M2 | TEMPERATURE: 98.5 F | DIASTOLIC BLOOD PRESSURE: 97 MMHG | SYSTOLIC BLOOD PRESSURE: 148 MMHG

## 2024-08-12 DIAGNOSIS — I10 ESSENTIAL HYPERTENSION: Primary | ICD-10-CM

## 2024-08-12 DIAGNOSIS — F41.9 ANXIETY: ICD-10-CM

## 2024-08-12 PROCEDURE — 99214 OFFICE O/P EST MOD 30 MIN: CPT | Performed by: NURSE PRACTITIONER

## 2024-08-12 RX ORDER — BUSPIRONE HYDROCHLORIDE 5 MG/1
5 TABLET ORAL 3 TIMES DAILY
Qty: 90 TABLET | Refills: 1 | Status: SHIPPED | OUTPATIENT
Start: 2024-08-12

## 2024-08-12 RX ORDER — HYDROCHLOROTHIAZIDE 12.5 MG/1
12.5 TABLET ORAL EVERY MORNING
Qty: 90 TABLET | Refills: 1 | Status: SHIPPED | OUTPATIENT
Start: 2024-08-12

## 2024-08-12 NOTE — PATIENT INSTRUCTIONS
Generalized Anxiety Disorder, Adult  Generalized anxiety disorder (JOSEPH) is a mental health condition. Unlike normal worries, anxiety related to JOSEPH is not triggered by a specific event. These worries do not fade or get better with time. JOSEPH interferes with relationships, work, and school.  JOSEPH symptoms can vary from mild to severe. People with severe JOSEPH can have intense waves of anxiety with physical symptoms that are similar to panic attacks.  What are the causes?  The exact cause of JOSEPH is not known, but the following are believed to have an impact:  Differences in natural brain chemicals.  Genes passed down from parents to children.  Differences in the way threats are perceived.  Development and stress during childhood.  Personality.  What increases the risk?  The following factors may make you more likely to develop this condition:  Being female.  Having a family history of anxiety disorders.  Being very shy.  Experiencing very stressful life events, such as the death of a loved one.  Having a very stressful family environment.  What are the signs or symptoms?  People with JOSEPH often worry excessively about many things in their lives, such as their health and family. Symptoms may also include:  Mental and emotional symptoms:  Worrying excessively about natural disasters.  Fear of being late.  Difficulty concentrating.  Fears that others are judging your performance.  Physical symptoms:  Fatigue.  Headaches, muscle tension, muscle twitches, trembling, or feeling shaky.  Feeling like your heart is pounding or beating very fast.  Feeling out of breath or like you cannot take a deep breath.  Having trouble falling asleep or staying asleep, or experiencing restlessness.  Sweating.  Nausea, diarrhea, or irritable bowel syndrome (IBS).  Behavioral symptoms:  Experiencing erratic moods or irritability.  Avoidance of new situations.  Avoidance of people.  Extreme difficulty making decisions.  How is this diagnosed?  This  condition is diagnosed based on your symptoms and medical history. You will also have a physical exam. Your health care provider may perform tests to rule out other possible causes of your symptoms.  To be diagnosed with JOSEPH, a person must have anxiety that:  Is out of his or her control.  Affects several different aspects of his or her life, such as work and relationships.  Causes distress that makes him or her unable to take part in normal activities.  Includes at least three symptoms of JOSEPH, such as restlessness, fatigue, trouble concentrating, irritability, muscle tension, or sleep problems.  Before your health care provider can confirm a diagnosis of JOSEPH, these symptoms must be present more days than they are not, and they must last for 6 months or longer.  How is this treated?  This condition may be treated with:  Medicine. Antidepressant medicine is usually prescribed for long-term daily control. Anti-anxiety medicines may be added in severe cases, especially when panic attacks occur.  Talk therapy (psychotherapy). Certain types of talk therapy can be helpful in treating JOSEPH by providing support, education, and guidance. Options include:  Cognitive behavioral therapy (CBT). People learn coping skills and self-calming techniques to ease their physical symptoms. They learn to identify unrealistic thoughts and behaviors and to replace them with more appropriate thoughts and behaviors.  Acceptance and commitment therapy (ACT). This treatment teaches people how to be mindful as a way to cope with unwanted thoughts and feelings.  Biofeedback. This process trains you to manage your body's response (physiological response) through breathing techniques and relaxation methods. You will work with a therapist while machines are used to monitor your physical symptoms.  Stress management techniques. These include yoga, meditation, and exercise.  A mental health specialist can help determine which treatment is best for you.  Some people see improvement with one type of therapy. However, other people require a combination of therapies.  Follow these instructions at home:  Lifestyle  Maintain a consistent routine and schedule.  Anticipate stressful situations. Create a plan and allow extra time to work with your plan.  Practice stress management or self-calming techniques that you have learned from your therapist or your health care provider.  Exercise regularly and spend time outdoors.  Eat a healthy diet that includes plenty of vegetables, fruits, whole grains, low-fat dairy products, and lean protein.  Do not eat a lot of foods that are high in fat, added sugar, or salt (sodium).  Drink plenty of water.  Avoid alcohol. Alcohol can increase anxiety.  Avoid caffeine and certain over-the-counter cold medicines. These may make you feel worse. Ask your pharmacist which medicines to avoid.  General instructions  Take over-the-counter and prescription medicines only as told by your health care provider.  Understand that you are likely to have setbacks. Accept this and be kind to yourself as you persist to take better care of yourself.  Anticipate stressful situations. Create a plan and allow extra time to work with your plan.  Recognize and accept your accomplishments, even if you  them as small.  Spend time with people who care about you.  Keep all follow-up visits. This is important.  Where to find more information  National [place] of Mental Health: www.nimh.nih.gov  Substance Abuse and Mental Health Services: www.samhsa.gov  Contact a health care provider if:  Your symptoms do not get better.  Your symptoms get worse.  You have signs of depression, such as:  A persistently sad or irritable mood.  Loss of enjoyment in activities that used to bring you kenneth.  Change in weight or eating.  Changes in sleeping habits.  Get help right away if:  You have thoughts about hurting yourself or others.  If you ever feel like you may hurt  "yourself or others, or have thoughts about taking your own life, get help right away. Go to your nearest emergency department or:  Call your local emergency services (281 in the [place]  Call a suicide crisis helpline, such as the National Suicide Prevention Lifeline at 1-406.714.2017 or 366 in the [place] This is open 24 hours a day in the [place]  Text the Crisis Text Line at 454411 (in the [place]  Summary  Generalized anxiety disorder (JOSEPH) is a mental health condition that involves worry that is not triggered by a specific event.  People with JOSEPH often worry excessively about many things in their lives, such as their health and family.  JOSEPH may cause symptoms such as restlessness, trouble concentrating, sleep problems, frequent sweating, nausea, diarrhea, headaches, and trembling or muscle twitching.  A mental health specialist can help determine which treatment is best for you. Some people see improvement with one type of therapy. However, other people require a combination of therapies.  This information is not intended to replace advice given to you by your health care provider. Make sure you discuss any questions you have with your health care provider.  Document Revised: 07/13/2022 Document Reviewed: 04/10/2022  Elsevier Patient Education © 2024 [employer]  Hypertension, Adult  High blood pressure (hypertension) is when the force of blood pumping through the arteries is too strong. The arteries are the blood vessels that carry blood from the heart throughout the body. Hypertension forces the heart to work harder to pump blood and may cause arteries to become narrow or stiff. Untreated or uncontrolled hypertension can lead to a heart attack, heart failure, a stroke, kidney disease, and other problems.  A blood pressure reading consists of a higher number over a lower number. Ideally, your blood pressure should be below 120/80. The first (\"top\") number is called the systolic pressure. It is a measure of the " "pressure in your arteries as your heart beats. The second (\"bottom\") number is called the diastolic pressure. It is a measure of the pressure in your arteries as the heart relaxes.  What are the causes?  The exact cause of this condition is not known. There are some conditions that result in high blood pressure.  What increases the risk?  Certain factors may make you more likely to develop high blood pressure. Some of these risk factors are under your control, including:  Smoking.  Not getting enough exercise or physical activity.  Being overweight.  Having too much fat, sugar, calories, or salt (sodium) in your diet.  Drinking too much alcohol.  Other risk factors include:  Having a personal history of heart disease, diabetes, high cholesterol, or kidney disease.  Stress.  Having a family history of high blood pressure and high cholesterol.  Having obstructive sleep apnea.  Age. The risk increases with age.  What are the signs or symptoms?  High blood pressure may not cause symptoms. Very high blood pressure (hypertensive crisis) may cause:  Headache.  Fast or irregular heartbeats (palpitations).  Shortness of breath.  Nosebleed.  Nausea and vomiting.  Vision changes.  Severe chest pain, dizziness, and seizures.  How is this diagnosed?  This condition is diagnosed by measuring your blood pressure while you are seated, with your arm resting on a flat surface, your legs uncrossed, and your feet flat on the floor. The cuff of the blood pressure monitor will be placed directly against the skin of your upper arm at the level of your heart. Blood pressure should be measured at least twice using the same arm. Certain conditions can cause a difference in blood pressure between your right and left arms.  If you have a high blood pressure reading during one visit or you have normal blood pressure with other risk factors, you may be asked to:  Return on a different day to have your blood pressure checked again.  Monitor your " blood pressure at home for 1 week or longer.  If you are diagnosed with hypertension, you may have other blood or imaging tests to help your health care provider understand your overall risk for other conditions.  How is this treated?  This condition is treated by making healthy lifestyle changes, such as eating healthy foods, exercising more, and reducing your alcohol intake. You may be referred for counseling on a healthy diet and physical activity.  Your health care provider may prescribe medicine if lifestyle changes are not enough to get your blood pressure under control and if:  Your systolic blood pressure is above 130.  Your diastolic blood pressure is above 80.  Your personal target blood pressure may vary depending on your medical conditions, your age, and other factors.  Follow these instructions at home:  Eating and drinking    Eat a diet that is high in fiber and potassium, and low in sodium, added sugar, and fat. An example of this eating plan is called the DASH diet. DASH stands for Dietary Approaches to Stop Hypertension. To eat this way:  Eat plenty of fresh fruits and vegetables. Try to fill one half of your plate at each meal with fruits and vegetables.  Eat whole grains, such as whole-wheat pasta, brown rice, or whole-grain bread. Fill about one fourth of your plate with whole grains.  Eat or drink low-fat dairy products, such as skim milk or low-fat yogurt.  Avoid fatty cuts of meat, processed or cured meats, and poultry with skin. Fill about one fourth of your plate with lean proteins, such as fish, chicken without skin, beans, eggs, or tofu.  Avoid pre-made and processed foods. These tend to be higher in sodium, added sugar, and fat.  Reduce your daily sodium intake. Many people with hypertension should eat less than 1,500 mg of sodium a day.  Do not drink alcohol if:  Your health care provider tells you not to drink.  You are pregnant, may be pregnant, or are planning to become pregnant.  If  you drink alcohol:  Limit how much you have to:  0-1 drink a day for women.  0-2 drinks a day for men.  Know how much alcohol is in your drink. In the [place] one drink equals one 12 oz bottle of beer (355 mL), one 5 oz glass of wine (148 mL), or one 1½ oz glass of hard liquor (44 mL).  Lifestyle    Work with your health care provider to maintain a healthy body weight or to lose weight. Ask what an ideal weight is for you.  Get at least 30 minutes of exercise that causes your heart to beat faster (aerobic exercise) most days of the week. Activities may include walking, swimming, or biking.  Include exercise to strengthen your muscles (resistance exercise), such as Pilates or lifting weights, as part of your weekly exercise routine. Try to do these types of exercises for 30 minutes at least 3 days a week.  Do not use any products that contain nicotine or tobacco. These products include cigarettes, chewing tobacco, and vaping devices, such as e-cigarettes. If you need help quitting, ask your health care provider.  Monitor your blood pressure at home as told by your health care provider.  Keep all follow-up visits. This is important.  Medicines  Take over-the-counter and prescription medicines only as told by your health care provider. Follow directions carefully. Blood pressure medicines must be taken as prescribed.  Do not skip doses of blood pressure medicine. Doing this puts you at risk for problems and can make the medicine less effective.  Ask your health care provider about side effects or reactions to medicines that you should watch for.  Contact a health care provider if you:  Think you are having a reaction to a medicine you are taking.  Have headaches that keep coming back (recurring).  Feel dizzy.  Have swelling in your ankles.  Have trouble with your vision.  Get help right away if you:  Develop a severe headache or confusion.  Have unusual weakness or numbness.  Feel faint.  Have severe pain in your chest  or abdomen.  Vomit repeatedly.  Have trouble breathing.  These symptoms may be an emergency. Get help right away. Call 911.  Do not wait to see if the symptoms will go away.  Do not drive yourself to the hospital.  Summary  Hypertension is when the force of blood pumping through your arteries is too strong. If this condition is not controlled, it may put you at risk for serious complications.  Your personal target blood pressure may vary depending on your medical conditions, your age, and other factors. For most people, a normal blood pressure is less than 120/80.  Hypertension is treated with lifestyle changes, medicines, or a combination of both. Lifestyle changes include losing weight, eating a healthy, low-sodium diet, exercising more, and limiting alcohol.  This information is not intended to replace advice given to you by your health care provider. Make sure you discuss any questions you have with your health care provider.  Document Revised: 10/25/2022 Document Reviewed: 10/25/2022  Elsevier Patient Education © 2024 [employer]

## 2024-08-12 NOTE — PROGRESS NOTES
"Chief Complaint  Allergic Reaction (Reaction to quelbree, [facility]), Migraine (Since taking medication ), and Hypertension (Since taking medication)    Subjective          Mendoza Jaramillo is a 33 y.o. female who presents to [facility]    History of Present Illness  History of Present Illness  The patient presents for evaluation of multiple medical concerns.    She was previously on Strattera for ADHD, but found it ineffective, leading to a switch to Qelbree. She began taking Qelbree on 08/02/2024. However, on 08/06/2023, she experienced severe headaches and a general feeling of unwellness, leading her to discontinue the medication. Since discontinuing the medication, her moods have remained unchanged. She visited the ER on 08/09/2024 due to a severe migraine accompanied by vomiting. She was treated for the migraine in the ER. Prior to her hospital visit, she experienced leg tremors, which were not alleviated by a migraine cocktail. Upon returning home, she found relief from a 70-pound dog lying on her legs. She reports elevated blood pressure and panic episodes, the cause of which is unknown. She is unsure if her migraines are resolved. She is hesitant to take additional ADHD medication and is unsure if she has ADD. She denies feelings of sadness or depression, but reports a sensation of \"brain zaps\". She is hyperfocused and has been panicking for days, feeling tired and crying.    She monitors her blood pressure at home, which typically reads 130 systolic but 90 diastolic. She denies increased salt intake or consumption of energy drinks or electrolyte replacements.       PHQ-2 Total Score:     PHQ-9 Total Score:          Health Maintenance Due   Topic Date Due    TDAP/TD VACCINES (1 - Tdap) Never done    PAP SMEAR  12/15/2023    INFLUENZA VACCINE  08/01/2024        Review of Systems   Constitutional:  Negative for fever.   Neurological:  Positive for headaches. "   Psychiatric/Behavioral:  Positive for decreased concentration. Negative for self-injury, sleep disturbance and suicidal ideas. The patient is nervous/anxious.           Medical History: has a past medical history of Anxiety, Depression, Esophageal ulcer without bleeding, Essential hypertension, Gastric ulcer, GERD (gastroesophageal reflux disease), Hiatal hernia (04/16/2018), Interstitial cystitis (06/02/2017), Kidney stones, Microhematuria, and Overactive bladder (06/02/2017).     Surgical History: has a past surgical history that includes [place] tooth extraction; Esophagogastroduodenoscopy (05/2017); and Colonoscopy (08/2017).     Family History: family history includes Colon cancer in an other family member; Diabetes in her maternal grandmother; Hypertension in her brother, daughter, father, and paternal grandmother; Nephrolithiasis in her mother; Other in an other family member.     Social History: reports that she has never smoked. She has never used smokeless tobacco. She reports that she does not currently use alcohol. She reports that she does not use drugs.    Allergies: Sulfa antibiotics, Amoxicillin, and Sulfate      Current Outpatient Medications:     escitalopram (LEXAPRO) 20 MG tablet, Take 1 tablet by mouth Daily., Disp: 90 tablet, Rfl: 3    fexofenadine (ALLEGRA) 180 MG tablet, TAKE 1 TABLET BY MOUTH EVERY DAY AS NEEDED FOR ITCHING, SNEEZING, RUNNY NOSE, OR WATERY EYES, Disp: , Rfl:     omeprazole (priLOSEC) 40 MG capsule, Take 1 capsule by mouth Daily. before a meal, Disp: 90 capsule, Rfl: 1    busPIRone (BUSPAR) 5 MG tablet, Take 1 tablet by mouth 3 (Three) Times a Day., Disp: 90 tablet, Rfl: 1    hydroCHLOROthiazide 12.5 MG tablet, Take 1 tablet by mouth Every Morning., Disp: 90 tablet, Rfl: 1      Immunization History   Administered Date(s) Administered    COVID-19 (MODERNA) 1st,2nd,3rd Dose Monovalent 04/12/2021, 05/10/2021    Fluzone (or Fluarix & Flulaval for VFC) >6mos 09/28/2023     "Influenza, Unspecified 10/07/2020, 10/07/2020         Objective       Vitals:    08/12/24 0922 08/12/24 0926   BP: (!) 160/110 148/97   Pulse: 82    Temp: 98.5 °F (36.9 °C)    TempSrc: Temporal    SpO2: 98%    Weight: 83.2 kg (183 lb 6.4 oz)    Height: 157.5 cm (62.01\")       Body mass index is 33.54 kg/m².   Wt Readings from Last 3 Encounters:   08/12/24 83.2 kg (183 lb 6.4 oz)   05/16/24 83 kg (183 lb)   04/10/24 85.5 kg (188 lb 6.4 oz)      BP Readings from Last 3 Encounters:   08/12/24 148/97   05/16/24 116/79   04/10/24 121/79             Physical Exam  Vitals reviewed.   Constitutional:       Appearance: Normal appearance. She is well-developed.   HENT:      Head: Normocephalic and atraumatic.   Eyes:      Conjunctiva/sclera: Conjunctivae normal.      Pupils: Pupils are equal, round, and reactive to light.   Cardiovascular:      Rate and Rhythm: Normal rate and regular rhythm.      Heart sounds: Normal heart sounds. No murmur heard.  Pulmonary:      Effort: Pulmonary effort is normal.      Breath sounds: Normal breath sounds. No wheezing or rhonchi.   Abdominal:      General: Bowel sounds are normal. There is no distension.      Palpations: Abdomen is soft.      Tenderness: There is no abdominal tenderness.   Skin:     General: Skin is warm and dry.   Neurological:      Mental Status: She is alert and oriented to person, place, and time.   Psychiatric:         Mood and Affect: Mood and affect normal.         Behavior: Behavior normal.         Thought Content: Thought content normal.         Judgment: Judgment normal.         Physical Exam  Vital Signs  Vitals show a blood pressure of 148/97.      Result Review :   Results         Common labs          4/10/2024    09:35   Common Labs   Glucose 94    BUN 7    Creatinine 1.03    Sodium 138    Potassium 4.3    Chloride 101    Calcium 9.3    Albumin 4.4    Total Bilirubin 0.3    Alkaline Phosphatase 117    AST (SGOT) 13    ALT (SGPT) 18    WBC 9.75    Hemoglobin " 12.9    Hematocrit 40.7    Platelets 339    Total Cholesterol 135    Triglycerides 108    HDL Cholesterol 37    LDL Cholesterol  78                     Assessment and Plan        Diagnoses and all orders for this visit:    1. Essential hypertension (Primary)  -     hydroCHLOROthiazide 12.5 MG tablet; Take 1 tablet by mouth Every Morning.  Dispense: 90 tablet; Refill: 1    2. Anxiety  -     busPIRone (BUSPAR) 5 MG tablet; Take 1 tablet by mouth 3 (Three) Times a Day.  Dispense: 90 tablet; Refill: 1        Assessment & Plan  1. Attention deficit hyperactivity disorder.  Withdrawal of the Qelbree is suspected.       2. Hypertension  Hydrochlorothiazide will be prescribed, with instructions to halve the dose if her blood pressure reaches 120/80. She is advised to monitor her blood pressure and gradually discontinue the medication if necessary.    2. Anxiety and depression.  BuSpar will be added to her Lexapro regimen, starting at bedtime. If well-tolerated within 3 to 5 days, the morning dose will be added. If twice daily dose is sufficient, she can maintain it. If she requires a midday dose, 5 mg can be taken three times daily. GeneSight testing was discussed, but she will verify with her insurance provider.    Follow-up  A follow-up appointment is scheduled for 30 days.    Follow Up     Return in about 3 months (around 11/12/2024) for Next scheduled follow up.    Patient was given instructions and counseling regarding her condition or for health maintenance advice. Please see specific information pulled into the AVS if appropriate.     CANDIDA Holbrook    Patient or patient representative verbalized consent for the use of Ambient Listening during the visit with  CANDIDA Holbrook for chart documentation. 8/12/2024  09:47 EDT

## 2024-09-18 ENCOUNTER — OFFICE VISIT (OUTPATIENT)
Dept: FAMILY MEDICINE CLINIC | Facility: CLINIC | Age: 34
End: 2024-09-18
Payer: COMMERCIAL

## 2024-09-18 VITALS
BODY MASS INDEX: 34.48 KG/M2 | HEART RATE: 71 BPM | DIASTOLIC BLOOD PRESSURE: 78 MMHG | HEIGHT: 62 IN | TEMPERATURE: 97.7 F | OXYGEN SATURATION: 99 % | SYSTOLIC BLOOD PRESSURE: 114 MMHG | WEIGHT: 187.4 LBS

## 2024-09-18 DIAGNOSIS — Z23 NEED FOR INFLUENZA VACCINATION: Primary | ICD-10-CM

## 2024-09-18 DIAGNOSIS — K21.9 GASTROESOPHAGEAL REFLUX DISEASE WITHOUT ESOPHAGITIS: ICD-10-CM

## 2024-09-18 DIAGNOSIS — F41.9 ANXIETY: ICD-10-CM

## 2024-09-18 DIAGNOSIS — I10 ESSENTIAL HYPERTENSION: ICD-10-CM

## 2024-09-18 PROCEDURE — 90656 IIV3 VACC NO PRSV 0.5 ML IM: CPT | Performed by: NURSE PRACTITIONER

## 2024-09-18 PROCEDURE — 90471 IMMUNIZATION ADMIN: CPT | Performed by: NURSE PRACTITIONER

## 2024-09-18 PROCEDURE — 99213 OFFICE O/P EST LOW 20 MIN: CPT | Performed by: NURSE PRACTITIONER

## 2024-09-18 RX ORDER — OMEPRAZOLE 40 MG/1
40 CAPSULE, DELAYED RELEASE ORAL DAILY
Qty: 90 CAPSULE | Refills: 1 | Status: SHIPPED | OUTPATIENT
Start: 2024-09-18

## 2024-10-05 DIAGNOSIS — F41.9 ANXIETY: ICD-10-CM

## 2024-10-07 RX ORDER — BUSPIRONE HYDROCHLORIDE 5 MG/1
5 TABLET ORAL 3 TIMES DAILY
Qty: 90 TABLET | Refills: 1 | Status: SHIPPED | OUTPATIENT
Start: 2024-10-07

## 2024-10-24 NOTE — PROGRESS NOTES
Chief Complaint  Amenorrhea (Has not had in 70+ days)    Subjective          Mendoza Jaramillo is a 33 y.o. female who presents to [facility]    History of Present Illness  History of Present Illness  The patient presents for evaluation of irregular periods.    She reports experiencing irregular menstrual cycles, with her last period occurring on 07/11/2024. She often feels as though her period is about to start, but it does not. She recalls an abnormal scan from a year ago, which led to her being prescribed birth control pills. She has noticed weight gain since her pregnancy and is not currently using any form of birth control. She was previously on a progesterone-only mini pill due to high blood pressure, which resulted in monthly urinary tract infections. She also mentions the recent appearance of a single chin hair. She is not experiencing any abdominal pain upon palpation. She had a Pap smear during her pregnancy and was advised to continue using birth control.    She has noticed an itchy spot on her back for the past two days.  Patient stopped her birth control back in May 2024 and has not had a period since July 11 the first day of her last period.    PHQ-2 Total Score:      PHQ-9 Total Score:          Health Maintenance Due   Topic Date Due    TDAP/TD VACCINES (1 - Tdap) Never done        Review of Systems   Constitutional:  Negative for chills, fatigue and fever.   Respiratory:  Negative for cough and shortness of breath.    Cardiovascular:  Negative for chest pain and palpitations.   Gastrointestinal:  Negative for constipation, diarrhea, nausea and vomiting.   Genitourinary:  Positive for menstrual problem.   Musculoskeletal:  Negative for back pain and neck pain.   Skin:  Positive for rash.   Neurological:  Negative for dizziness and headaches.          Medical History: has a past medical history of Anxiety, Depression, Esophageal ulcer without bleeding, Essential  "hypertension, Gastric ulcer, GERD (gastroesophageal reflux disease), Hiatal hernia (04/16/2018), Interstitial cystitis (06/02/2017), Kidney stones, Microhematuria, and Overactive bladder (06/02/2017).     Surgical History: has a past surgical history that includes [place] tooth extraction; Esophagogastroduodenoscopy (05/2017); and Colonoscopy (08/2017).     Family History: family history includes Colon cancer in an other family member; Diabetes in her maternal grandmother; Hypertension in her brother, daughter, father, and paternal grandmother; Nephrolithiasis in her mother; Other in an other family member.     Social History: reports that she has never smoked. She has never used smokeless tobacco. She reports that she does not currently use alcohol. She reports that she does not use drugs.    Allergies: Sulfa antibiotics, Amoxicillin, and Sulfate      Current Outpatient Medications:     busPIRone (BUSPAR) 5 MG tablet, TAKE 1 TABLET BY MOUTH THREE TIMES A DAY, Disp: 90 tablet, Rfl: 1    escitalopram (LEXAPRO) 20 MG tablet, Take 1 tablet by mouth Daily., Disp: 90 tablet, Rfl: 3    fexofenadine (ALLEGRA) 180 MG tablet, TAKE 1 TABLET BY MOUTH EVERY DAY AS NEEDED FOR ITCHING, SNEEZING, RUNNY NOSE, OR WATERY EYES, Disp: , Rfl:     hydroCHLOROthiazide 12.5 MG tablet, Take 1 tablet by mouth Every Morning., Disp: 90 tablet, Rfl: 1    omeprazole (priLOSEC) 40 MG capsule, Take 1 capsule by mouth Daily. before a meal, Disp: 90 capsule, Rfl: 1      Immunization History   Administered Date(s) Administered    COVID-19 (MODERNA) 1st,2nd,3rd Dose Monovalent 04/12/2021, 05/10/2021    Fluzone  >6mos 09/18/2024    Fluzone (or Fluarix & Flulaval for VFC) >6mos 09/28/2023    Influenza, Unspecified 10/07/2020, 10/07/2020         Objective       Vitals:    10/28/24 1059   BP: 118/78   Pulse: 62   Temp: 97.9 °F (36.6 °C)   TempSrc: Temporal   SpO2: 98%   Weight: 87.2 kg (192 lb 3.2 oz)   Height: 157.5 cm (62.01\")      Body mass index is " 35.14 kg/m².   Wt Readings from Last 3 Encounters:   10/28/24 87.2 kg (192 lb 3.2 oz)   09/18/24 85 kg (187 lb 6.4 oz)   08/12/24 83.2 kg (183 lb 6.4 oz)      BP Readings from Last 3 Encounters:   10/28/24 118/78   09/18/24 114/78   08/12/24 148/97             Physical Exam  Vitals reviewed.   Constitutional:       Appearance: Normal appearance. She is well-developed.   HENT:      Head: Normocephalic and atraumatic.   Eyes:      Conjunctiva/sclera: Conjunctivae normal.      Pupils: Pupils are equal, round, and reactive to light.   Cardiovascular:      Rate and Rhythm: Normal rate and regular rhythm.      Heart sounds: Normal heart sounds. No murmur heard.  Pulmonary:      Effort: Pulmonary effort is normal.      Breath sounds: Normal breath sounds. No wheezing or rhonchi.   Abdominal:      General: Bowel sounds are normal. There is no distension.      Palpations: Abdomen is soft.      Tenderness: There is no abdominal tenderness.   Skin:     General: Skin is warm and dry.             Comments: 6 mm erythematous lesion.    Neurological:      Mental Status: She is alert and oriented to person, place, and time.   Psychiatric:         Mood and Affect: Mood and affect normal.         Behavior: Behavior normal.         Thought Content: Thought content normal.         Judgment: Judgment normal.         Physical Exam        Result Review :   Results  Imaging  Ultrasound of pelvis in November 2023 showed right ovary had more than 12 follicles, consistent with polycystic ovaries.       Common labs          4/10/2024    09:35   Common Labs   Glucose 94    BUN 7    Creatinine 1.03    Sodium 138    Potassium 4.3    Chloride 101    Calcium 9.3    Albumin 4.4    Total Bilirubin 0.3    Alkaline Phosphatase 117    AST (SGOT) 13    ALT (SGPT) 18    WBC 9.75    Hemoglobin 12.9    Hematocrit 40.7    Platelets 339    Total Cholesterol 135    Triglycerides 108    HDL Cholesterol 37    LDL Cholesterol  78                     Assessment  and Plan        Diagnoses and all orders for this visit:    1. Secondary amenorrhea (Primary)  -     DHEA-Sulfate; Future  -     TSH+Free T4; Future  -     Testosterone, Free, Total; Future  -     Insulin, Total; Future  -     hCG, Serum, Qualitative; Future  -     hCG, Serum, Qualitative  -     Insulin, Total  -     Testosterone, Free, Total  -     TSH+Free T4  -     DHEA-Sulfate    2. PCOS (polycystic ovarian syndrome)    3. Class 2 severe obesity due to excess calories with serious comorbidity and body mass index (BMI) of 35.0 to 35.9 in adult    4. Essential hypertension      The patient is asked to make an attempt to improve diet and exercise patterns to aid in medical management of these diagnoses.  Assessment & Plan  1. Polycystic Ovary Syndrome (PCOS).  Her irregular menstrual cycles are indicative of PCOS, confirmed by an ultrasound in November 2023 showing more than 12 follicles in the right ovary. Blood work will be ordered to assess DHEA sulfate, thyroid, and fasting insulin levels. Metformin was suggested as a potential treatment option to help regulate ovulation and manage weight. She was advised to engage in 150 minutes of exercise per week and to adopt a Mediterranean diet, reducing intake of carbohydrates, sugar, and processed foods. If she decides to start Metformin, she should take measures to prevent pregnancy as it can stimulate ovulation.    2. Eczema.  She was advised to apply a thick emollient lotion such as Cetaphil or Eucerin. Over-the-counter cortisone cream was recommended for use if itching occurs.      Return for Pending results.    Patient was given instructions and counseling regarding her condition or for health maintenance advice. Please see specific information pulled into the AVS if appropriate.     CANDIDA Holbrook    Patient or patient representative verbalized consent for the use of Ambient Listening during the visit with  CANDIDA Holbrook for chart documentation.  10/28/2024  11:09 EDT        Answers submitted by the patient for this visit:  Other (Submitted on 10/28/2024)  Please describe your symptoms.: Have not had a menstrual cycle in 70+ days and not pregnant or on birth control. Made Latisha aware and she wanted me to come in for blood work.  Have you had these symptoms before?: No  How long have you been having these symptoms?: Greater than 2 weeks  Please list any medications you are currently taking for this condition.: None  Please describe any probable cause for these symptoms. : None  Primary Reason for Visit (Submitted on 10/28/2024)  What is the primary reason for your visit?: Problem Not Listed

## 2024-10-28 ENCOUNTER — OFFICE VISIT (OUTPATIENT)
Dept: FAMILY MEDICINE CLINIC | Facility: CLINIC | Age: 34
End: 2024-10-28
Payer: COMMERCIAL

## 2024-10-28 VITALS
SYSTOLIC BLOOD PRESSURE: 118 MMHG | DIASTOLIC BLOOD PRESSURE: 78 MMHG | TEMPERATURE: 97.9 F | OXYGEN SATURATION: 98 % | HEART RATE: 62 BPM | BODY MASS INDEX: 35.37 KG/M2 | WEIGHT: 192.2 LBS | HEIGHT: 62 IN

## 2024-10-28 DIAGNOSIS — E66.01 CLASS 2 SEVERE OBESITY DUE TO EXCESS CALORIES WITH SERIOUS COMORBIDITY AND BODY MASS INDEX (BMI) OF 35.0 TO 35.9 IN ADULT: ICD-10-CM

## 2024-10-28 DIAGNOSIS — E28.2 PCOS (POLYCYSTIC OVARIAN SYNDROME): ICD-10-CM

## 2024-10-28 DIAGNOSIS — E66.812 CLASS 2 SEVERE OBESITY DUE TO EXCESS CALORIES WITH SERIOUS COMORBIDITY AND BODY MASS INDEX (BMI) OF 35.0 TO 35.9 IN ADULT: ICD-10-CM

## 2024-10-28 DIAGNOSIS — N91.1 SECONDARY AMENORRHEA: Primary | ICD-10-CM

## 2024-10-28 DIAGNOSIS — I10 ESSENTIAL HYPERTENSION: ICD-10-CM

## 2024-10-28 LAB
HCG SERPL QL: NEGATIVE
T4 FREE SERPL-MCNC: 0.92 NG/DL (ref 0.92–1.68)
TSH SERPL DL<=0.05 MIU/L-ACNC: 1.48 UIU/ML (ref 0.27–4.2)

## 2024-10-28 PROCEDURE — 84439 ASSAY OF FREE THYROXINE: CPT | Performed by: NURSE PRACTITIONER

## 2024-10-28 PROCEDURE — 84402 ASSAY OF FREE TESTOSTERONE: CPT | Performed by: NURSE PRACTITIONER

## 2024-10-28 PROCEDURE — 82627 DEHYDROEPIANDROSTERONE: CPT | Performed by: NURSE PRACTITIONER

## 2024-10-28 PROCEDURE — 83525 ASSAY OF INSULIN: CPT | Performed by: NURSE PRACTITIONER

## 2024-10-28 PROCEDURE — 84443 ASSAY THYROID STIM HORMONE: CPT | Performed by: NURSE PRACTITIONER

## 2024-10-28 PROCEDURE — 84403 ASSAY OF TOTAL TESTOSTERONE: CPT | Performed by: NURSE PRACTITIONER

## 2024-10-28 PROCEDURE — 84703 CHORIONIC GONADOTROPIN ASSAY: CPT | Performed by: NURSE PRACTITIONER

## 2024-10-29 LAB
DHEA-S SERPL-MCNC: 385 UG/DL (ref 84.8–378)
INSULIN SERPL-ACNC: 13.7 UIU/ML (ref 2.6–24.9)

## 2024-10-31 ENCOUNTER — TELEPHONE (OUTPATIENT)
Dept: FAMILY MEDICINE CLINIC | Facility: CLINIC | Age: 34
End: 2024-10-31
Payer: COMMERCIAL

## 2024-10-31 NOTE — TELEPHONE ENCOUNTER
Patient called to let you know she started her period today.  She just wanted it documented in her chart.

## 2024-11-01 LAB
TESTOST FREE SERPL-MCNC: 5.1 PG/ML (ref 0–4.2)
TESTOST SERPL-MCNC: 53 NG/DL (ref 8–60)

## 2024-11-05 ENCOUNTER — DOCUMENTATION (OUTPATIENT)
Dept: FAMILY MEDICINE CLINIC | Facility: CLINIC | Age: 34
End: 2024-11-05
Payer: COMMERCIAL

## 2024-11-05 ENCOUNTER — TELEPHONE (OUTPATIENT)
Dept: FAMILY MEDICINE CLINIC | Facility: CLINIC | Age: 34
End: 2024-11-05
Payer: COMMERCIAL

## 2024-12-26 DIAGNOSIS — F41.9 ANXIETY: ICD-10-CM

## 2024-12-26 RX ORDER — ESCITALOPRAM OXALATE 20 MG/1
20 TABLET ORAL DAILY
Qty: 30 TABLET | Refills: 8 | Status: SHIPPED | OUTPATIENT
Start: 2024-12-26

## 2025-02-28 NOTE — PROGRESS NOTES
Chief Complaint  Anxiety (Follow up)    Subjective          Mendoza Jaramillo is a 34 y.o. female who presents to [facility]    History of Present Illness  History of Present Illness  The patient presents for evaluation of anxiety, blood pressure management, polycystic ovarian syndrome, and gastroesophageal reflux disease.    She reports a stable condition with no new symptoms. Her blood pressure is well-regulated, and she has discontinued the use of diuretics due to adverse effects. Despite this, her blood pressure remains within normal limits.    She has been experiencing irregular menstrual cycles, which she attributes to her polycystic ovary syndrome (PCOS). She experienced a heavy menstrual flow this month, but it was within the normal duration and ceased appropriately. She anticipates that her menstrual cycle will regularize following the discontinuation of birth control. She has an appointment with her gynecologist today to discuss these issues and undergo a Pap smear.    Her anxiety is well-managed with Lexapro, and she utilizes BuSpar on an as-needed basis during periods of heightened stress.    She reports satisfactory control of her gastroesophageal reflux disease (GERD) symptoms. She had a consultation with her gastroenterologist last month, during which a HIDA scan was performed for abdominal pain. The results of the scan were unremarkable.    FAMILY HISTORY  Her mother had a huge polyp removed during a colonoscopy last month, which was not cancerous but could turn into cancer.    MEDICATIONS  Lexapro, BuSpar      Little interest or pleasure in doing things? Not at all   Feeling down, depressed, or hopeless? Not at all   PHQ-2 Total Score 0                Health Maintenance Due   Topic Date Due    TDAP/TD VACCINES (1 - Tdap) Never done    PAP SMEAR  02/08/2025        Review of Systems   Constitutional:  Negative for fever.   Psychiatric/Behavioral:  The patient is  nervous/anxious.           Medical History: has a past medical history of Anxiety, Depression, Esophageal ulcer without bleeding, Essential hypertension, Gastric ulcer, GERD (gastroesophageal reflux disease), Hiatal hernia (04/16/2018), Interstitial cystitis (06/02/2017), Kidney stones, Microhematuria, and Overactive bladder (06/02/2017).     Surgical History: has a past surgical history that includes [place] tooth extraction; Esophagogastroduodenoscopy (05/2017); and Colonoscopy (08/2017).     Family History: family history includes Colon cancer in an other family member; Diabetes in her maternal grandmother; Hypertension in her brother, daughter, father, and paternal grandmother; Nephrolithiasis in her mother; Other in an other family member.     Social History: reports that she has never smoked. She has never used smokeless tobacco. She reports that she does not currently use alcohol. She reports that she does not use drugs.    Allergies: Sulfa antibiotics, Amoxicillin, and Sulfate      Current Outpatient Medications:     busPIRone (BUSPAR) 5 MG tablet, TAKE 1 TABLET BY MOUTH THREE TIMES A DAY, Disp: 90 tablet, Rfl: 1    escitalopram (LEXAPRO) 20 MG tablet, TAKE 1 TABLET BY MOUTH EVERY DAY, Disp: 30 tablet, Rfl: 8    fexofenadine (ALLEGRA) 180 MG tablet, TAKE 1 TABLET BY MOUTH EVERY DAY AS NEEDED FOR ITCHING, SNEEZING, RUNNY NOSE, OR WATERY EYES, Disp: , Rfl:     omeprazole (priLOSEC) 40 MG capsule, Take 1 capsule by mouth Daily. before a meal, Disp: 90 capsule, Rfl: 1      Immunization History   Administered Date(s) Administered    COVID-19 (MODERNA) 1st,2nd,3rd Dose Monovalent 04/12/2021, 05/10/2021    Fluzone  >6mos 09/18/2024    Fluzone (or Fluarix & Flulaval for VFC) >6mos 09/28/2023    Influenza, Unspecified 10/07/2020, 10/07/2020         Objective       Vitals:    03/13/25 0812   BP: 114/78   Pulse: 70   Temp: 98.1 °F (36.7 °C)   TempSrc: Temporal   SpO2: 100%   Weight: 89.4 kg (197 lb)   Height: 157.5 cm  "(62.01\")      Body mass index is 36.02 kg/m².   Wt Readings from Last 3 Encounters:   03/13/25 89.4 kg (197 lb)   10/28/24 87.2 kg (192 lb 3.2 oz)   09/18/24 85 kg (187 lb 6.4 oz)      BP Readings from Last 3 Encounters:   03/13/25 114/78   10/28/24 118/78   09/18/24 114/78             Physical Exam  Vitals reviewed.   Constitutional:       Appearance: Normal appearance. She is well-developed.   HENT:      Head: Normocephalic and atraumatic.   Eyes:      Conjunctiva/sclera: Conjunctivae normal.      Pupils: Pupils are equal, round, and reactive to light.   Cardiovascular:      Rate and Rhythm: Normal rate and regular rhythm.      Heart sounds: Normal heart sounds. No murmur heard.  Pulmonary:      Effort: Pulmonary effort is normal.      Breath sounds: Normal breath sounds. No wheezing or rhonchi.   Abdominal:      General: Bowel sounds are normal. There is no distension.      Palpations: Abdomen is soft.      Tenderness: There is no abdominal tenderness.   Skin:     General: Skin is warm and dry.   Neurological:      Mental Status: She is alert and oriented to person, place, and time.   Psychiatric:         Mood and Affect: Mood and affect normal.         Behavior: Behavior normal.         Thought Content: Thought content normal.         Judgment: Judgment normal.         Physical Exam  Neck was examined.  Lungs were auscultated.    Vital Signs  Blood pressure is 114/78. Weight is 197.      Result Review :   Results  Imaging  HIDA scan showed normal gallbladder ejection fraction.         Common labs          4/10/2024    09:35   Common Labs   Glucose 94    BUN 7    Creatinine 1.03    Sodium 138    Potassium 4.3    Chloride 101    Calcium 9.3    Albumin 4.4    Total Bilirubin 0.3    Alkaline Phosphatase 117    AST (SGOT) 13    ALT (SGPT) 18    WBC 9.75    Hemoglobin 12.9    Hematocrit 40.7    Platelets 339    Total Cholesterol 135    Triglycerides 108    HDL Cholesterol 37    LDL Cholesterol  78                 "     Assessment and Plan        Diagnoses and all orders for this visit:    1. Anxiety (Primary)  Comments:  continue with lexapro and buspar as pt is doing well.    2. Gastroesophageal reflux disease without esophagitis  Comments:  continue carafate.  Orders:  -     omeprazole (priLOSEC) 40 MG capsule; Take 1 capsule by mouth Daily. before a meal  Dispense: 90 capsule; Refill: 1    3. Class 2 obesity due to excess calories without serious comorbidity with body mass index (BMI) of 36.0 to 36.9 in adult    4. PCOS (polycystic ovarian syndrome)    5. Screening for lipid disorders  -     Lipid Panel    6. Encounter for lipid screening for cardiovascular disease  -     Comprehensive Metabolic Panel    7. Laboratory exam ordered as part of routine general medical examination  -     CBC (No Diff)    8. Screening for thyroid disorder  -     TSH    9. Vitamin D deficiency  -     Vitamin D,25-Hydroxy      The patient is asked to make an attempt to improve diet and exercise patterns to aid in medical management of these diagnoses.  Assessment & Plan  1. Anxiety.  Her anxiety is currently well-managed with Lexapro. She uses BuSpar as needed, particularly on stressful days. No refill for BuSpar is required at this time.    2. Blood pressure management.  Her blood pressure is well-controlled at 114/78 mmHg. She has stopped taking the diuretic due to adverse effects but continues to maintain good blood pressure levels.    3. Polycystic ovary syndrome (PCOS).  She reports irregular periods and spotting, which are common symptoms of PCOS. Weight loss is recommended as a primary management strategy unless she opts for metformin or birth control. She is scheduled to see her gynecologist today for further evaluation and a Pap smear.    4. Gastroesophageal reflux disease (GERD).  Her GERD is currently well-managed. A prescription refill for her GERD medication will be sent to [facility].    5. Suspected irritable bowel syndrome (IBS).  She  reports abdominal pain. A recent HIDA scan showed a normal gallbladder ejection fraction, suggesting IBS as a probable cause.    Return in about 6 months (around 9/13/2025).    Patient was given instructions and counseling regarding her condition or for health maintenance advice. Please see specific information pulled into the AVS if appropriate.     CANDIDA Holbrook    Patient or patient representative verbalized consent for the use of Ambient Listening during the visit with  CANDIDA Holbrook for chart documentation. 3/13/2025  08:26 EDT

## 2025-03-13 ENCOUNTER — OFFICE VISIT (OUTPATIENT)
Dept: FAMILY MEDICINE CLINIC | Facility: CLINIC | Age: 35
End: 2025-03-13
Payer: COMMERCIAL

## 2025-03-13 VITALS
TEMPERATURE: 98.1 F | SYSTOLIC BLOOD PRESSURE: 114 MMHG | DIASTOLIC BLOOD PRESSURE: 78 MMHG | OXYGEN SATURATION: 100 % | HEART RATE: 70 BPM | BODY MASS INDEX: 36.25 KG/M2 | WEIGHT: 197 LBS | HEIGHT: 62 IN

## 2025-03-13 DIAGNOSIS — Z13.29 SCREENING FOR THYROID DISORDER: ICD-10-CM

## 2025-03-13 DIAGNOSIS — E55.9 VITAMIN D DEFICIENCY: ICD-10-CM

## 2025-03-13 DIAGNOSIS — F41.9 ANXIETY: Primary | ICD-10-CM

## 2025-03-13 DIAGNOSIS — E28.2 PCOS (POLYCYSTIC OVARIAN SYNDROME): ICD-10-CM

## 2025-03-13 DIAGNOSIS — E66.09 CLASS 2 OBESITY DUE TO EXCESS CALORIES WITHOUT SERIOUS COMORBIDITY WITH BODY MASS INDEX (BMI) OF 36.0 TO 36.9 IN ADULT: ICD-10-CM

## 2025-03-13 DIAGNOSIS — Z00.00 LABORATORY EXAM ORDERED AS PART OF ROUTINE GENERAL MEDICAL EXAMINATION: ICD-10-CM

## 2025-03-13 DIAGNOSIS — Z13.220 ENCOUNTER FOR LIPID SCREENING FOR CARDIOVASCULAR DISEASE: ICD-10-CM

## 2025-03-13 DIAGNOSIS — E66.812 CLASS 2 OBESITY DUE TO EXCESS CALORIES WITHOUT SERIOUS COMORBIDITY WITH BODY MASS INDEX (BMI) OF 36.0 TO 36.9 IN ADULT: ICD-10-CM

## 2025-03-13 DIAGNOSIS — Z13.6 ENCOUNTER FOR LIPID SCREENING FOR CARDIOVASCULAR DISEASE: ICD-10-CM

## 2025-03-13 DIAGNOSIS — Z13.220 SCREENING FOR LIPID DISORDERS: ICD-10-CM

## 2025-03-13 DIAGNOSIS — K21.9 GASTROESOPHAGEAL REFLUX DISEASE WITHOUT ESOPHAGITIS: ICD-10-CM

## 2025-03-13 LAB
25(OH)D3 SERPL-MCNC: 32.5 NG/ML (ref 30–100)
ALBUMIN SERPL-MCNC: 4.1 G/DL (ref 3.5–5.2)
ALBUMIN/GLOB SERPL: 1.3 G/DL
ALP SERPL-CCNC: 122 U/L (ref 39–117)
ALT SERPL W P-5'-P-CCNC: 22 U/L (ref 1–33)
ANION GAP SERPL CALCULATED.3IONS-SCNC: 9.6 MMOL/L (ref 5–15)
AST SERPL-CCNC: 26 U/L (ref 1–32)
BILIRUB SERPL-MCNC: 0.3 MG/DL (ref 0–1.2)
BUN SERPL-MCNC: 7 MG/DL (ref 6–20)
BUN/CREAT SERPL: 7.9 (ref 7–25)
CALCIUM SPEC-SCNC: 9.3 MG/DL (ref 8.6–10.5)
CHLORIDE SERPL-SCNC: 103 MMOL/L (ref 98–107)
CHOLEST SERPL-MCNC: 153 MG/DL (ref 0–200)
CO2 SERPL-SCNC: 27.4 MMOL/L (ref 22–29)
CREAT SERPL-MCNC: 0.89 MG/DL (ref 0.57–1)
DEPRECATED RDW RBC AUTO: 42.8 FL (ref 37–54)
EGFRCR SERPLBLD CKD-EPI 2021: 88.5 ML/MIN/1.73
ERYTHROCYTE [DISTWIDTH] IN BLOOD BY AUTOMATED COUNT: 13.4 % (ref 12.3–15.4)
GLOBULIN UR ELPH-MCNC: 3.1 GM/DL
GLUCOSE SERPL-MCNC: 96 MG/DL (ref 65–99)
HCT VFR BLD AUTO: 41.4 % (ref 34–46.6)
HDLC SERPL-MCNC: 36 MG/DL (ref 40–60)
HGB BLD-MCNC: 12.7 G/DL (ref 12–15.9)
LDLC SERPL CALC-MCNC: 88 MG/DL (ref 0–100)
LDLC/HDLC SERPL: 2.33 {RATIO}
MCH RBC QN AUTO: 26.5 PG (ref 26.6–33)
MCHC RBC AUTO-ENTMCNC: 30.7 G/DL (ref 31.5–35.7)
MCV RBC AUTO: 86.3 FL (ref 79–97)
PLATELET # BLD AUTO: 338 10*3/MM3 (ref 140–450)
PMV BLD AUTO: 11.8 FL (ref 6–12)
POTASSIUM SERPL-SCNC: 4.3 MMOL/L (ref 3.5–5.2)
PROT SERPL-MCNC: 7.2 G/DL (ref 6–8.5)
RBC # BLD AUTO: 4.8 10*6/MM3 (ref 3.77–5.28)
SODIUM SERPL-SCNC: 140 MMOL/L (ref 136–145)
TRIGL SERPL-MCNC: 166 MG/DL (ref 0–150)
TSH SERPL DL<=0.05 MIU/L-ACNC: 1.04 UIU/ML (ref 0.27–4.2)
VLDLC SERPL-MCNC: 29 MG/DL (ref 5–40)
WBC NRBC COR # BLD AUTO: 8.13 10*3/MM3 (ref 3.4–10.8)

## 2025-03-13 PROCEDURE — 84443 ASSAY THYROID STIM HORMONE: CPT | Performed by: NURSE PRACTITIONER

## 2025-03-13 PROCEDURE — 82306 VITAMIN D 25 HYDROXY: CPT | Performed by: NURSE PRACTITIONER

## 2025-03-13 PROCEDURE — 80053 COMPREHEN METABOLIC PANEL: CPT | Performed by: NURSE PRACTITIONER

## 2025-03-13 PROCEDURE — 80061 LIPID PANEL: CPT | Performed by: NURSE PRACTITIONER

## 2025-03-13 PROCEDURE — 85027 COMPLETE CBC AUTOMATED: CPT | Performed by: NURSE PRACTITIONER

## 2025-03-13 RX ORDER — OMEPRAZOLE 40 MG/1
40 CAPSULE, DELAYED RELEASE ORAL DAILY
Qty: 90 CAPSULE | Refills: 1 | Status: SHIPPED | OUTPATIENT
Start: 2025-03-13

## 2025-04-07 ENCOUNTER — TELEPHONE (OUTPATIENT)
Dept: FAMILY MEDICINE CLINIC | Facility: CLINIC | Age: 35
End: 2025-04-07

## 2025-04-07 NOTE — TELEPHONE ENCOUNTER
"Relay     \"Left voicemail for patient to call back and reschedule appointment- OR if she can still be here at her appointment time we can still see her.\"                "

## 2025-04-09 ENCOUNTER — PATIENT MESSAGE (OUTPATIENT)
Dept: FAMILY MEDICINE CLINIC | Facility: CLINIC | Age: 35
End: 2025-04-09
Payer: COMMERCIAL

## 2025-04-10 ENCOUNTER — CLINICAL SUPPORT (OUTPATIENT)
Dept: FAMILY MEDICINE CLINIC | Facility: CLINIC | Age: 35
End: 2025-04-10
Payer: COMMERCIAL

## 2025-04-10 DIAGNOSIS — F32.A ACUTE DEPRESSION: ICD-10-CM

## 2025-04-10 DIAGNOSIS — F41.9 ANXIETY: Primary | ICD-10-CM

## 2025-04-22 RX ORDER — DESVENLAFAXINE 50 MG/1
50 TABLET, FILM COATED, EXTENDED RELEASE ORAL DAILY
Qty: 30 TABLET | Refills: 1 | Status: SHIPPED | OUTPATIENT
Start: 2025-04-22

## 2025-05-12 ENCOUNTER — OFFICE VISIT (OUTPATIENT)
Dept: FAMILY MEDICINE CLINIC | Facility: CLINIC | Age: 35
End: 2025-05-12
Payer: COMMERCIAL

## 2025-05-12 VITALS
SYSTOLIC BLOOD PRESSURE: 125 MMHG | HEART RATE: 80 BPM | OXYGEN SATURATION: 98 % | HEIGHT: 62 IN | DIASTOLIC BLOOD PRESSURE: 85 MMHG | WEIGHT: 194.2 LBS | TEMPERATURE: 97.5 F | BODY MASS INDEX: 35.74 KG/M2

## 2025-05-12 DIAGNOSIS — J02.9 SORE THROAT: ICD-10-CM

## 2025-05-12 DIAGNOSIS — J06.9 VIRAL UPPER RESPIRATORY TRACT INFECTION: Primary | ICD-10-CM

## 2025-05-12 LAB
EXPIRATION DATE: NORMAL
INTERNAL CONTROL: NORMAL
Lab: NORMAL
S PYO AG THROAT QL: NEGATIVE

## 2025-05-12 PROCEDURE — 87081 CULTURE SCREEN ONLY: CPT | Performed by: NURSE PRACTITIONER

## 2025-05-12 PROCEDURE — 87880 STREP A ASSAY W/OPTIC: CPT | Performed by: NURSE PRACTITIONER

## 2025-05-12 PROCEDURE — 99213 OFFICE O/P EST LOW 20 MIN: CPT | Performed by: NURSE PRACTITIONER

## 2025-05-12 RX ORDER — BROMPHENIRAMINE MALEATE, PSEUDOEPHEDRINE HYDROCHLORIDE, AND DEXTROMETHORPHAN HYDROBROMIDE 2; 30; 10 MG/5ML; MG/5ML; MG/5ML
10 SYRUP ORAL 4 TIMES DAILY PRN
Qty: 473 ML | Refills: 0 | Status: SHIPPED | OUTPATIENT
Start: 2025-05-12 | End: 2025-05-19

## 2025-05-12 RX ORDER — METHYLPREDNISOLONE 4 MG/1
TABLET ORAL
Qty: 21 TABLET | Refills: 0 | Status: SHIPPED | OUTPATIENT
Start: 2025-05-12

## 2025-05-12 NOTE — PROGRESS NOTES
"Mendoza Jaramillo presents to [facility] with complaint of  Cough (PT had a fever of 100.5 on Friday, but hasn't had one since. PT states her symptoms started on Friday and that she possibly caught it from her .), Sinus Problem (Drainage. PT has been taking advil cold and sinus ), Headache, and Nasal Congestion    SUBJECTIVE  URI   This is a new problem. Episode onset: 3 days ago. The problem has been unchanged. The maximum temperature recorded prior to her arrival was 100.4 - 100.9 F (on Friday, no fever past 48 hrs). Associated symptoms include congestion, coughing (sometimes productive), a plugged ear sensation, rhinorrhea, sinus pain, a sore throat (feels scratchy) and wheezing. Pertinent negatives include no abdominal pain or ear pain. Treatments tried: advil cold and sinus, sinus rinses, mucinex DM. The treatment provided mild relief.         OBJECTIVE  Vital Signs:   /85   Pulse 80   Temp 97.5 °F (36.4 °C) (Oral)   Ht 157.5 cm (62.01\")   Wt 88.1 kg (194 lb 3.2 oz)   SpO2 98%   BMI 35.51 kg/m²       Physical Exam  Vitals reviewed.   Constitutional:       General: She is not in acute distress.     Appearance: Normal appearance. She is not ill-appearing.   HENT:      Head: Normocephalic and atraumatic.      Right Ear: A middle ear effusion is present. Tympanic membrane is not erythematous, retracted or bulging.      Left Ear: A middle ear effusion is present. Tympanic membrane is not erythematous, retracted or bulging.      Nose: Congestion and rhinorrhea present.      Mouth/Throat:      Pharynx: Uvula midline. Posterior oropharyngeal erythema and uvula swelling present. No oropharyngeal exudate.      Tonsils: No tonsillar exudate. 1+ on the right. 2+ on the left.   Cardiovascular:      Rate and Rhythm: Normal rate and regular rhythm.      Pulses: Normal pulses.      Heart sounds: Normal heart sounds.   Pulmonary:      Effort: Pulmonary effort is normal. "   Musculoskeletal:      Cervical back: Neck supple.   Lymphadenopathy:      Cervical: Cervical adenopathy present.   Skin:     General: Skin is warm and dry.   Neurological:      General: No focal deficit present.      Mental Status: She is alert and oriented to person, place, and time. Mental status is at baseline.   Psychiatric:         Mood and Affect: Mood normal.         Behavior: Behavior normal.         Judgment: Judgment normal.        Results Review:    Office Visit on 05/12/2025   Component Date Value Ref Range Status    Rapid Strep A Screen 05/12/2025 Negative  Negative, VALID, INVALID, Not Performed Final    Internal Control 05/12/2025 Passed  Passed Final    Lot Number 05/12/2025 [id number]   Final    Expiration Date 05/12/2025 04-   Final          ASSESSMENT AND PLAN:  Diagnoses and all orders for this visit:    1. Viral upper respiratory tract infection (Primary)    2. Sore throat  -     POC Rapid Strep A  -     Beta Strep Culture, Throat - Swab, Throat; Future  -     Beta Strep Culture, Throat - Swab, Throat    Other orders  -     methylPREDNISolone (MEDROL) 4 MG dose pack; Take as directed on package instructions.  Dispense: 21 tablet; Refill: 0  -     brompheniramine-pseudoephedrine-DM 30-2-10 MG/5ML syrup; Take 10 mL by mouth 4 (Four) Times a Day As Needed for Cough or Congestion for up to 7 days.  Dispense: 473 mL; Refill: 0        Patient likely has viral illness.  Educated patient that symptomatic treatment is recommended. The patient was encouraged to increase rest and fluids. May take Tylenol for pain or fever. If symptoms persist 7-10 days or longer, patient should notify office.  If not seeing improvement at that point, antibiotic therapy may be needed as  bacterial infection may have occurred.  Patient understands that if new symptoms develop or current symptoms are significantly worsened, they should follow-up in office physically.  In the meantime, she will try Bromfed, Medrol  Dosepak.  Rapid strep test is negative.  Culture pending.       Follow Up   Return if symptoms worsen or fail to improve. Patient to notify office with any acute concerns or issues.  Patient verbalizes understanding, agrees with plan of care and has no further questions upon discharge.     Patient was given instructions and counseling regarding her condition or for health maintenance advice. Please see specific information pulled into the AVS if appropriate.     Discussed the importance of following up with any needed screening tests/labs/specialist appointments and any requested follow-up recommended by me today. Importance of maintaining follow-up discussed and patient accepts that missed appointments can delay diagnosis and potentially lead to worsening of conditions.    Part of this note may be an electronic transcription/translation of spoken language to printed text using the Dragon Dictation System.

## 2025-05-14 LAB — BACTERIA SPEC AEROBE CULT: NORMAL

## 2025-05-19 ENCOUNTER — OFFICE VISIT (OUTPATIENT)
Dept: FAMILY MEDICINE CLINIC | Facility: CLINIC | Age: 35
End: 2025-05-19
Payer: COMMERCIAL

## 2025-05-19 VITALS
OXYGEN SATURATION: 99 % | BODY MASS INDEX: 36.18 KG/M2 | DIASTOLIC BLOOD PRESSURE: 92 MMHG | HEART RATE: 86 BPM | WEIGHT: 196.6 LBS | TEMPERATURE: 97.9 F | HEIGHT: 62 IN | SYSTOLIC BLOOD PRESSURE: 135 MMHG

## 2025-05-19 DIAGNOSIS — J22 LOWER RESPIRATORY INFECTION: Primary | ICD-10-CM

## 2025-05-19 DIAGNOSIS — F32.A ACUTE DEPRESSION: ICD-10-CM

## 2025-05-19 PROCEDURE — 99213 OFFICE O/P EST LOW 20 MIN: CPT | Performed by: NURSE PRACTITIONER

## 2025-05-19 RX ORDER — AZITHROMYCIN 250 MG/1
TABLET, FILM COATED ORAL
Qty: 6 TABLET | Refills: 0 | Status: SHIPPED | OUTPATIENT
Start: 2025-05-19

## 2025-05-19 NOTE — PROGRESS NOTES
"Mendoza Jaramillo presents to [facility] with complaint of  URI (Cough has gotten worse.)    SUBJECTIVE  URI   This is a new problem. Episode onset: 10 days ago. The problem has been gradually improving. There has been no fever. Associated symptoms include congestion, coughing and a plugged ear sensation. Pertinent negatives include no chest pain, headaches, rhinorrhea, sinus pain, sneezing or sore throat. Treatments tried: medrol, dayquil, bromfed. The treatment provided moderate relief.     Patient also only started Pristiq.  She messaged her PCP stating that it made her feel angry.  Patient is asking if the steroid she was on may have been the result of that instead of the Pristiq.        OBJECTIVE  Vital Signs:   /92   Pulse 86   Temp 97.9 °F (36.6 °C) (Oral)   Ht 157.5 cm (62.01\")   Wt 89.2 kg (196 lb 9.6 oz)   SpO2 99%   BMI 35.95 kg/m²       Physical Exam  Vitals reviewed.   Constitutional:       General: She is not in acute distress.     Appearance: Normal appearance. She is not ill-appearing.   HENT:      Head: Normocephalic and atraumatic.      Right Ear: No middle ear effusion.      Left Ear: A middle ear effusion (mild, improved) is present.   Cardiovascular:      Rate and Rhythm: Normal rate and regular rhythm.      Pulses: Normal pulses.      Heart sounds: Normal heart sounds.   Pulmonary:      Effort: Pulmonary effort is normal.      Breath sounds: Normal breath sounds.   Musculoskeletal:      Cervical back: Neck supple.   Lymphadenopathy:      Cervical: No cervical adenopathy.   Skin:     General: Skin is warm and dry.   Neurological:      General: No focal deficit present.      Mental Status: She is alert and oriented to person, place, and time. Mental status is at baseline.   Psychiatric:         Mood and Affect: Mood normal.         Behavior: Behavior normal.         Judgment: Judgment normal.              ASSESSMENT AND PLAN:  Diagnoses and all " orders for this visit:    1. Lower respiratory infection (Primary)    2. Acute depression    Other orders  -     azithromycin (Zithromax Z-Jayy) 250 MG tablet; Take 2 tablets by mouth on day 1, then 1 tablet daily on days 2-5  Dispense: 6 tablet; Refill: 0      Patient was prescribed Z-Jayy.  Reassuring that she is no longer afebrile, ear effusions improved as well as her throat appearance.    Advised that it is reasonable to try the Pristiq for another week as steroids may have caused mood fluctuations, she has appointment with her PCP for reassessment next week.        Follow Up   Return for Next scheduled follow up. Patient to notify office with any acute concerns or issues.  Patient verbalizes understanding, agrees with plan of care and has no further questions upon discharge.     Patient was given instructions and counseling regarding her condition or for health maintenance advice. Please see specific information pulled into the AVS if appropriate.     Discussed the importance of following up with any needed screening tests/labs/specialist appointments and any requested follow-up recommended by me today. Importance of maintaining follow-up discussed and patient accepts that missed appointments can delay diagnosis and potentially lead to worsening of conditions.    Part of this note may be an electronic transcription/translation of spoken language to printed text using the Dragon Dictation System.

## 2025-05-21 NOTE — PROGRESS NOTES
Chief Complaint  Anxiety (Would like to discuss medications)    Subjective          Mendoza Jaramillo is a 34 y.o. female who presents to [facility]    History of Present Illness  History of Present Illness    Patient reports that with the Pristiq she has had some aggression however she was on a steroid pack for an upper respiratory illness at the time once she stopped the steroid the aggressiveness stopped however patient does not feel that the Pristiq is serving her anxiety well.  Patient feels like her moods are not stable.  Patient wants to wean from Pristiq to see if her coping skills that she is developed in therapy will allow her to be without medication.    Reflux is well-controlled.    Patient does not exercise routinely.    The PHQ has not been completed during this encounter.               Health Maintenance Due   Topic Date Due    TDAP/TD VACCINES (1 - Tdap) Never done    PAP SMEAR  2025    ANNUAL PHYSICAL  2025        Review of Systems   Psychiatric/Behavioral:  Negative for self-injury, sleep disturbance and suicidal ideas. The patient is nervous/anxious.           Medical History: has a past medical history of Anxiety, Depression, Esophageal ulcer without bleeding, Essential hypertension, Gastric ulcer, GERD (gastroesophageal reflux disease), Hiatal hernia (2018), Interstitial cystitis (2017), Irritable bowel syndrome, Kidney stones, Microhematuria, Overactive bladder (2017), and Urinary tract infection (2023).     Surgical History: has a past surgical history that includes [place] tooth extraction; Esophagogastroduodenoscopy (2017); Colonoscopy (2017); and  section (2021).     Family History: family history includes COPD in her mother; Cancer in her father, maternal aunt, maternal uncle, mother, and paternal grandfather; Colon cancer in an other family member; Diabetes in her brother, maternal grandfather, and  "maternal grandmother; Heart disease in her maternal grandfather; Hypertension in her brother, daughter, father, and paternal grandmother; Liver disease in her brother; Nephrolithiasis in her mother; Other in her maternal uncle and another family member.     Social History: reports that she has never smoked. She has never used smokeless tobacco. She reports that she does not currently use alcohol. She reports that she does not use drugs.    Allergies: Sulfa antibiotics, Amoxicillin, and Sulfate      Current Outpatient Medications:     fexofenadine (ALLEGRA) 180 MG tablet, TAKE 1 TABLET BY MOUTH EVERY DAY AS NEEDED FOR ITCHING, SNEEZING, RUNNY NOSE, OR WATERY EYES, Disp: , Rfl:     omeprazole (priLOSEC) 40 MG capsule, Take 1 capsule by mouth Daily. before a meal, Disp: 90 capsule, Rfl: 1      Immunization History   Administered Date(s) Administered    COVID-19 (MODERNA) 1st,2nd,3rd Dose Monovalent 04/12/2021, 05/10/2021    Fluzone  >6mos 09/18/2024    Fluzone (or Fluarix & Flulaval for VFC) >6mos 09/28/2023    Influenza, Unspecified 10/07/2020, 10/07/2020         Objective       Vitals:    05/28/25 0736   BP: 122/87   Pulse: 84   Temp: 99.1 °F (37.3 °C)   TempSrc: Temporal   SpO2: 98%   Weight: 87.9 kg (193 lb 12.8 oz)   Height: 157.5 cm (62.01\")      Body mass index is 35.44 kg/m².   Wt Readings from Last 3 Encounters:   05/28/25 87.9 kg (193 lb 12.8 oz)   05/19/25 89.2 kg (196 lb 9.6 oz)   05/12/25 88.1 kg (194 lb 3.2 oz)      BP Readings from Last 3 Encounters:   05/28/25 122/87   05/19/25 135/92   05/12/25 125/85             Physical Exam  Vitals reviewed.   Constitutional:       Appearance: Normal appearance. She is well-developed.   HENT:      Head: Normocephalic and atraumatic.   Eyes:      Conjunctiva/sclera: Conjunctivae normal.      Pupils: Pupils are equal, round, and reactive to light.   Cardiovascular:      Rate and Rhythm: Normal rate and regular rhythm.      Heart sounds: Normal heart sounds. No murmur " heard.  Pulmonary:      Effort: Pulmonary effort is normal.      Breath sounds: Normal breath sounds. No wheezing or rhonchi.   Abdominal:      General: Bowel sounds are normal. There is no distension.      Palpations: Abdomen is soft.      Tenderness: There is no abdominal tenderness.   Skin:     General: Skin is warm and dry.   Neurological:      Mental Status: She is alert and oriented to person, place, and time.   Psychiatric:         Mood and Affect: Mood and affect normal.         Behavior: Behavior normal.         Thought Content: Thought content normal.         Judgment: Judgment normal.         Physical Exam        Result Review :   Results           Common labs          3/13/2025    08:46   Common Labs   Glucose 96    BUN 7    Creatinine 0.89    Sodium 140    Potassium 4.3    Chloride 103    Calcium 9.3    Albumin 4.1    Total Bilirubin 0.3    Alkaline Phosphatase 122    AST (SGOT) 26    ALT (SGPT) 22    WBC 8.13    Hemoglobin 12.7    Hematocrit 41.4    Platelets 338    Total Cholesterol 153    Triglycerides 166    HDL Cholesterol 36    LDL Cholesterol  88                     Assessment and Plan        Diagnoses and all orders for this visit:    1. Anxiety (Primary)  Comments:  Wean from Easy Ice per pt request.    2. Elevated blood pressure reading without diagnosis of hypertension      Patient advised to monitor blood pressure at home and journal readings.  Patient informed that a blood pressure reading at home of more than 130/80 is considered hypertension.  For readings greater than 140/90 or higher patient is advised to follow-up in the office with readings for management.  Patient advised to limit sodium intake.  Assessment & Plan      Patient was given instructions and counseling regarding her condition or for health maintenance advice. Please see specific information pulled into the AVS if appropriate.     CANDIDA Holbrook    Patient or patient representative verbalized consent for the use of  Ambient Listening during the visit with  CANDIDA Holbrook for chart documentation. 5/28/2025  07:51 EDT

## 2025-05-28 ENCOUNTER — OFFICE VISIT (OUTPATIENT)
Dept: FAMILY MEDICINE CLINIC | Facility: CLINIC | Age: 35
End: 2025-05-28
Payer: COMMERCIAL

## 2025-05-28 VITALS
HEART RATE: 84 BPM | DIASTOLIC BLOOD PRESSURE: 87 MMHG | BODY MASS INDEX: 35.66 KG/M2 | HEIGHT: 62 IN | WEIGHT: 193.8 LBS | OXYGEN SATURATION: 98 % | TEMPERATURE: 99.1 F | SYSTOLIC BLOOD PRESSURE: 122 MMHG

## 2025-05-28 DIAGNOSIS — R03.0 ELEVATED BLOOD PRESSURE READING WITHOUT DIAGNOSIS OF HYPERTENSION: ICD-10-CM

## 2025-05-28 DIAGNOSIS — F41.9 ANXIETY: Primary | ICD-10-CM

## 2025-05-28 PROCEDURE — 99213 OFFICE O/P EST LOW 20 MIN: CPT | Performed by: NURSE PRACTITIONER

## 2025-06-11 ENCOUNTER — PATIENT ROUNDING (BHMG ONLY) (OUTPATIENT)
Dept: URGENT CARE | Facility: CLINIC | Age: 35
End: 2025-06-11
Payer: COMMERCIAL

## 2025-06-11 NOTE — ED NOTES
Thank you for letting us care for you in your recent visit to our urgent care center. We would love to hear about your experience with us. Was this the first time you have visited our location?    We’re always looking for ways to make our patients’ experiences even better. Do you have any recommendations on ways we may improve?    I appreciate you taking the time to respond. Please be on the lookout for a survey about your recent visit from [employer] via text or email. We would greatly appreciate if you could fill that out and turn it back in. We want your voice to be heard and we value your feedback.  Thank you for choosing [facility] for your healthcare needs.

## 2025-06-19 RX ORDER — DESVENLAFAXINE 50 MG/1
50 TABLET, FILM COATED, EXTENDED RELEASE ORAL DAILY
Qty: 30 TABLET | Refills: 1 | OUTPATIENT
Start: 2025-06-19

## 2025-06-20 ENCOUNTER — TELEPHONE (OUTPATIENT)
Dept: FAMILY MEDICINE CLINIC | Facility: CLINIC | Age: 35
End: 2025-06-20
Payer: COMMERCIAL

## 2025-06-20 DIAGNOSIS — F41.9 ANXIETY: Primary | ICD-10-CM

## 2025-06-20 NOTE — TELEPHONE ENCOUNTER
Caller: Mendoza Jaramillo    Relationship: Self    Best call back number: [phone]    What is the best time to reach you: ANY    Who are you requesting to speak with (clinical staff, provider,  specific staff member): CLINICAL    What was the call regarding: PATIENT WANTED TO INFORM CANDIDA OSBALDO THAT SHE WOULD LIKE TO STAY ON MEDICATION   desvenlafaxine (PRISTIQ) 50 MG 24 hr tablet   SHE STATED THE SIDE EFFECTS HAVE SUBSIDED AND SHE IS DOING WELL ON THE MEDICINE. SHE WOULD LIKE TO HAVE A REFILL SENT TO:   [facility]/pharmacy #[id number] - [place], [place] - [address] - [phone]  - [phone] FX  [address] [place] [address]  Phone: [phone]  Fax: [phone]     SHE HAS ENOUGH MEDICATION UNTIL TUESDAY 06.24.2025

## 2025-06-22 RX ORDER — DESVENLAFAXINE 50 MG/1
50 TABLET, FILM COATED, EXTENDED RELEASE ORAL DAILY
Qty: 90 TABLET | Refills: 1 | Status: SHIPPED | OUTPATIENT
Start: 2025-06-22

## 2025-06-27 ENCOUNTER — OFFICE VISIT (OUTPATIENT)
Dept: FAMILY MEDICINE CLINIC | Facility: CLINIC | Age: 35
End: 2025-06-27
Payer: COMMERCIAL

## 2025-06-27 VITALS
DIASTOLIC BLOOD PRESSURE: 87 MMHG | HEART RATE: 77 BPM | SYSTOLIC BLOOD PRESSURE: 125 MMHG | TEMPERATURE: 98.6 F | HEIGHT: 62 IN | BODY MASS INDEX: 35.33 KG/M2 | WEIGHT: 192 LBS

## 2025-06-27 DIAGNOSIS — R09.82 PND (POST-NASAL DRIP): Primary | ICD-10-CM

## 2025-06-27 DIAGNOSIS — R09.81 SINUS CONGESTION: ICD-10-CM

## 2025-06-27 PROCEDURE — 99213 OFFICE O/P EST LOW 20 MIN: CPT | Performed by: NURSE PRACTITIONER

## 2025-06-27 RX ORDER — IPRATROPIUM BROMIDE 42 UG/1
2 SPRAY, METERED NASAL 4 TIMES DAILY
Qty: 15 ML | Refills: 5 | Status: SHIPPED | OUTPATIENT
Start: 2025-06-27

## 2025-06-27 NOTE — PROGRESS NOTES
Mendoza Jaramillo presents to [facility] with complaint of  Sinusitis (Almost 2 months now, curious if its a medication side effect/)    SUBJECTIVE  History of Present Illness    History of Present Illness  The patient is a 34-year-old female who presents for a follow-up of a sinus infection symptoms.    She has been experiencing symptoms for approximately 2 months. Medication was prescribed on 04/28/2025. Despite taking Z-Jayy, she only experienced temporary relief. She reports that her symptoms are not typical of her usual allergies. Doxycycline was prescribed by urgent care 6/9/25, which she took for 7 days. A week after completing the course, her symptoms recurred, including nasal congestion and cough. She also experienced fluid accumulation in her ears, leading to hearing impairment. Currently, she describes a sensation of razor-like sharpness in her throat when swallowing. She tested negative for strep and COVID-19 at urgent care and here in May as well.     She has been taking Allegra-D intermittently but prefers not to take it due to its jittery side effects. Plain Allegra is being used regularly, and ibuprofen is helpful for her throat pain. She has been getting only a few hours of sleep and experiences choking during sleep. A CPAP machine is used, which helps unless she starts coughing. Allergy testing and allergy injections were completed for 2 years, which did not reveal significant allergies. She discontinued the injections a year ago without any adverse effects. Singulair was tried without noticeable improvement.     She does not have an itchy or runny nose but continues to experience a sore throat. A nasal spray was prescribed, which she found helpful. Flonase was previously used, which caused nosebleeds. Gargling salt water is being done to alleviate her symptoms. Her ears feel better, although she occasionally experiences popping when blowing her nose.    She  "has been experiencing elevated blood pressure since starting Pristiq, which is unusual for her as she typically has low blood pressure. She has decided to discontinue Pristiq and has been off it since Monday. She reports feeling better with less Pristiq but continues to experience a dry cough. She also reports dry mouth, dizziness, and sweating since starting pristiq. She has been on Lexapro for 8 years and has been attending counseling and therapy.           OBJECTIVE  Vital Signs:   /87   Pulse 77   Temp 98.6 °F (37 °C) (Oral)   Ht 157.5 cm (62.01\")   Wt 87.1 kg (192 lb)   BMI 35.11 kg/m²       Physical Exam  Vitals reviewed.   Constitutional:       General: She is not in acute distress.     Appearance: She is not ill-appearing.   HENT:      Head: Normocephalic and atraumatic.      Right Ear: Tympanic membrane and ear canal normal.      Left Ear: Tympanic membrane and ear canal normal.      Nose: Congestion present.      Mouth/Throat:      Pharynx: Oropharynx is clear.   Cardiovascular:      Rate and Rhythm: Normal rate and regular rhythm.      Pulses: Normal pulses.      Heart sounds: Normal heart sounds.   Pulmonary:      Effort: Pulmonary effort is normal.      Breath sounds: Normal breath sounds.   Musculoskeletal:      Cervical back: Neck supple.   Skin:     General: Skin is warm and dry.   Neurological:      General: No focal deficit present.      Mental Status: She is alert and oriented to person, place, and time. Mental status is at baseline.   Psychiatric:         Mood and Affect: Mood normal.         Behavior: Behavior normal.         Judgment: Judgment normal.              ASSESSMENT AND PLAN:  Diagnoses and all orders for this visit:    1. PND (post-nasal drip) (Primary)    2. Sinus congestion  -     ipratropium (ATROVENT) 0.06 % nasal spray; Administer 2 sprays into the nostril(s) as directed by provider 4 (Four) Times a Day.  Dispense: 15 mL; Refill: 5        Assessment & Plan  1. Sinus " congestion/PND.  - Symptoms suggest a possible recurrent sinus infection or chronic sinus disease, potentially unrelated to Pristiq. The sore throat is likely due to postnasal drainage, which may also be triggering the cough.  - Presence of dark yellow nasal discharge initially suggested a bacterial infection, but the current clear discharge indicates otherwise.  - Transition to Xyzal continue using the Ipratropium nasal spray. Refills for the nasal spray will be provided.  - Gradually continue to discontinue Pristiq. If fever, bloody nasal discharge, or severe sinus pressure develops, inform immediately as this could indicate bacterial infection. If symptoms persist after 3 to 4 weeks off Pristiq, a CT scan of the sinuses will be considered with potential to refer to allergy. If chronic sinus condition is confirmed, referral to ENT specialist will be made.    2. History of elevated blood pressure reading.  - Reported elevated blood pressure since starting Pristiq, BP improved today  - Blood pressure monitoring.  - Discontinue Pristiq.  - Follow-up if blood pressure remains elevated.          Follow Up   Return if symptoms worsen or fail to improve. Patient to notify office with any acute concerns or issues.  Patient verbalizes understanding, agrees with plan of care and has no further questions upon discharge.     Patient was given instructions and counseling regarding her condition or for health maintenance advice. Please see specific information pulled into the AVS if appropriate.     Discussed the importance of following up with any needed screening tests/labs/specialist appointments and any requested follow-up recommended by me today. Importance of maintaining follow-up discussed and patient accepts that missed appointments can delay diagnosis and potentially lead to worsening of conditions.    Patient or patient representative verbalized consent for the use of Ambient Listening during the visit with  Maryann  CANDIDA Seth for chart documentation. 6/27/2025  11:19 EDT

## 2025-06-27 NOTE — Clinical Note
This is the patient who is wanting to switch to me if we can check with Stefani for concerns. Thanks!

## 2025-08-21 ENCOUNTER — OFFICE VISIT (OUTPATIENT)
Dept: FAMILY MEDICINE CLINIC | Facility: CLINIC | Age: 35
End: 2025-08-21
Payer: COMMERCIAL

## 2025-08-21 VITALS
DIASTOLIC BLOOD PRESSURE: 86 MMHG | HEIGHT: 62 IN | WEIGHT: 184 LBS | HEART RATE: 101 BPM | OXYGEN SATURATION: 99 % | BODY MASS INDEX: 33.86 KG/M2 | SYSTOLIC BLOOD PRESSURE: 143 MMHG

## 2025-08-21 DIAGNOSIS — H10.9 BACTERIAL CONJUNCTIVITIS: Primary | ICD-10-CM

## 2025-08-21 DIAGNOSIS — I10 ESSENTIAL HYPERTENSION: ICD-10-CM

## 2025-08-21 PROCEDURE — 99214 OFFICE O/P EST MOD 30 MIN: CPT | Performed by: NURSE PRACTITIONER

## 2025-08-21 RX ORDER — NIFEDIPINE 30 MG/1
30 TABLET, EXTENDED RELEASE ORAL DAILY
Qty: 90 TABLET | Refills: 0 | Status: SHIPPED | OUTPATIENT
Start: 2025-08-21

## 2025-08-21 RX ORDER — POLYMYXIN B SULFATE AND TRIMETHOPRIM 1; 10000 MG/ML; [USP'U]/ML
2 SOLUTION OPHTHALMIC EVERY 4 HOURS
Qty: 10 ML | Refills: 0 | Status: SHIPPED | OUTPATIENT
Start: 2025-08-21 | End: 2025-08-25